# Patient Record
Sex: FEMALE | Race: WHITE | NOT HISPANIC OR LATINO | Employment: UNEMPLOYED | URBAN - NONMETROPOLITAN AREA
[De-identification: names, ages, dates, MRNs, and addresses within clinical notes are randomized per-mention and may not be internally consistent; named-entity substitution may affect disease eponyms.]

---

## 2018-01-24 ENCOUNTER — OFFICE VISIT (OUTPATIENT)
Dept: FAMILY MEDICINE CLINIC | Facility: CLINIC | Age: 23
End: 2018-01-24

## 2018-01-24 VITALS
WEIGHT: 224 LBS | BODY MASS INDEX: 33.18 KG/M2 | HEIGHT: 69 IN | HEART RATE: 76 BPM | DIASTOLIC BLOOD PRESSURE: 91 MMHG | SYSTOLIC BLOOD PRESSURE: 155 MMHG | OXYGEN SATURATION: 99 %

## 2018-01-24 DIAGNOSIS — L70.9 ACNE, UNSPECIFIED ACNE TYPE: ICD-10-CM

## 2018-01-24 DIAGNOSIS — J31.0 RHINOSINUSITIS: Primary | ICD-10-CM

## 2018-01-24 DIAGNOSIS — L72.0 EPIDERMOID CYST OF SKIN OF CHEST: ICD-10-CM

## 2018-01-24 DIAGNOSIS — J32.9 RHINOSINUSITIS: Primary | ICD-10-CM

## 2018-01-24 PROCEDURE — 99203 OFFICE O/P NEW LOW 30 MIN: CPT | Performed by: NURSE PRACTITIONER

## 2018-01-24 RX ORDER — FLUTICASONE PROPIONATE 50 MCG
2 SPRAY, SUSPENSION (ML) NASAL DAILY
Qty: 16 G | Refills: 5 | Status: SHIPPED | OUTPATIENT
Start: 2018-01-24 | End: 2019-02-26

## 2018-01-24 RX ORDER — LORATADINE 10 MG/1
10 TABLET ORAL DAILY
Qty: 30 TABLET | Refills: 5 | Status: SHIPPED | OUTPATIENT
Start: 2018-01-24 | End: 2019-02-26

## 2018-01-24 NOTE — PROGRESS NOTES
Subjective   Sandy Stephenson is a 22 y.o. female.     Chief Complaint: Establish Care    History of Present Illness   Patient here to establish care today.  Patient has previously been seen by pediatrician.  She is now 22 years old and needs to have a primary care provider.  Patient states that she does have a history of intermittent elevated blood pressure.  She denies any chest pain, palpitations, headaches, dizziness or syncope.  She has never been placed on any medication for her blood pressure.  I have discussed with her today that we need to watch her blood pressure over the next few months.  If he continues to be elevated, she will need to go ahead and start on some medication.  We have discussed her diet; low sodium/salt intake.    Acne.  Patient states that she has had some increased acne on her face for the past few months.  She states that she was using some Kyara cleanser and her acne did increase from that product.  I have advised her to stop using the product altogether.  I have also advised her to try washing her face twice daily with antibacterial soap.  Patient states understanding today.    Patient states that she does have a cyst between her breasts that she has had for several months.  It is approximately quarter size.  She states the area is very irritated and causes her some discomfort when she lies down at night.  She states that she has squeezed the area before and had some clear to white discharge from the area.  She states the area seems to be getting larger at this time.  After discussion with patient today, I feel she needs to be referred to general surgeon to evaluate whether this cyst needs to be removed.  Patient is agreeable to this plan today.    Allergies.  Patient also states that she has been having some head and nasal congestion for the past couple of weeks.  She's been having some sneezing and watery eyes at times.  She denies fever, headache, sore throat, ear pain, nausea  vomiting or diarrhea.  She has not been taking any medication for her symptoms today.  I have suggested that she start on Claritin and Flonase for her symptoms.  Patient is agreeable to this plan today.    Family History   Problem Relation Age of Onset   • Hypertension Mother    • Thyroid disease Mother    • No Known Problems Father      car wreck    • Breast cancer Paternal Grandmother    • Hypertension Paternal Grandmother    • Arthritis Paternal Grandmother        Social History     Social History   • Marital status: Single     Spouse name: N/A   • Number of children: N/A   • Years of education: N/A     Occupational History   • Not on file.     Social History Main Topics   • Smoking status: Current Some Day Smoker     Types: Cigarettes   • Smokeless tobacco: Never Used   • Alcohol use No   • Drug use: No   • Sexual activity: Defer     Other Topics Concern   • Not on file     Social History Narrative   • No narrative on file       Past Medical History:   Diagnosis Date   • Headache    • Hypertension        Review of Systems   Constitutional: Negative.    HENT: Positive for congestion, postnasal drip and sneezing.    Respiratory: Negative.    Cardiovascular: Negative.    Gastrointestinal: Negative.    Musculoskeletal: Negative.    Skin:        Cyst between breasts; facial acne   Neurological: Negative.    Psychiatric/Behavioral: Negative.        Objective   Physical Exam   Constitutional: She is oriented to person, place, and time. She appears well-developed and well-nourished.   HENT:   Right Ear: External ear normal.   Left Ear: External ear normal.   Mouth/Throat: Oropharynx is clear and moist.   Eyes: Conjunctivae are normal. Pupils are equal, round, and reactive to light.   Neck: Normal range of motion. Neck supple.   Cardiovascular: Normal rate, regular rhythm and normal heart sounds.    Pulmonary/Chest: Effort normal and breath sounds normal.   Neurological: She is alert and oriented to person, place, and  "time.   Skin: Skin is warm and dry.   Psychiatric: She has a normal mood and affect. Her behavior is normal. Judgment and thought content normal.   Nursing note and vitals reviewed.      Procedures    Vitals: Blood pressure 155/91, pulse 76, height 175.3 cm (69\"), weight 102 kg (224 lb), SpO2 99 %, not currently breastfeeding.    Allergies: No Known Allergies       Assessment/Plan   Sandy was seen today for establish care.    Diagnoses and all orders for this visit:    Rhinosinusitis  -     loratadine (CLARITIN) 10 MG tablet; Take 1 tablet by mouth Daily.  -     fluticasone (FLONASE) 50 MCG/ACT nasal spray; 2 sprays into each nostril Daily.    Acne, unspecified acne type    Epidermoid cyst of skin of chest  -     Ambulatory Referral to General Surgery               "

## 2018-02-02 ENCOUNTER — OFFICE VISIT (OUTPATIENT)
Dept: SURGERY | Facility: CLINIC | Age: 23
End: 2018-02-02

## 2018-02-02 VITALS
SYSTOLIC BLOOD PRESSURE: 151 MMHG | WEIGHT: 224.2 LBS | HEIGHT: 69 IN | HEART RATE: 95 BPM | BODY MASS INDEX: 33.21 KG/M2 | DIASTOLIC BLOOD PRESSURE: 92 MMHG

## 2018-02-02 DIAGNOSIS — L72.3 SEBACEOUS CYST: Primary | ICD-10-CM

## 2018-02-02 PROCEDURE — 99242 OFF/OP CONSLTJ NEW/EST SF 20: CPT | Performed by: SURGERY

## 2018-02-02 NOTE — PROGRESS NOTES
"Subjective   Sandy Stephenson is a 22 y.o. female here today for place on her chest referred by Ute Taylor.    History of Present Illness  Ms. Stephenson was seen in the office today for evaluation of a symptomatic sebaceous cyst on her chest wall between her breasts.  The patient states this has been present for about a year.  At times it appears to swell and become more tender.  She has never had it drained.  Because of the ongoing tenderness the patient wishes to have it removed  No Known Allergies  Current Outpatient Prescriptions   Medication Sig Dispense Refill   • fluticasone (FLONASE) 50 MCG/ACT nasal spray 2 sprays into each nostril Daily. 16 g 5   • loratadine (CLARITIN) 10 MG tablet Take 1 tablet by mouth Daily. 30 tablet 5     No current facility-administered medications for this visit.      Past Medical History:   Diagnosis Date   • Headache    • Hypertension    • Hypertension      Past Surgical History:   Procedure Laterality Date   • WISDOM TOOTH EXTRACTION  2014     Review of Systems  General: fever  Integumentary: lump  Eyes: glasses  ENT: earache and recurrent sore throat  Respiratory: shortness of breath, chronic cough and wheezing  Gastrointestinal: diarrhea and constipation  Cardiovascular: chest pain  Neurological: numbness, confusion, headaches and dizziness  Psychiatric: anxiety  Hematologic/Lymphatic: negative  Genitourinary: negative  Musculoskeletal: back pain  Endocrine: negative  Breasts: negative    Objective   /92 (BP Location: Left arm, Patient Position: Sitting)  Pulse 95  Ht 175.3 cm (69\")  Wt 102 kg (224 lb 3.2 oz)  BMI 33.11 kg/m2  Physical Exam  On examination this is a well-developed well-nourished white female in no acute distress  HEENT examination: Within normal limits.  Conjunctiva pink.  Nose and ears appear normal.  Neck: Supple, full range of motion.  No JVD.  Musculoskeletal: Full range of motion all extremities without focal weakness. Normal gait. No digital " cyanosis.  Psych: Patient is alert, oriented x3. Mood and affect are appropriate.  Skin:  Between the breasts in the midline is a 2 x 0.9 cm non-currently infected sebaceous cyst.  Results/Data    Procedures     Assessment/Plan     Sebaceous cyst of chest wall.  Based upon the fact that it fluctuates in size and becomes more tender I suspect it is getting intermittently infected and based upon that fact it is reasonable to proceed with office excision.  The patient will return for office procedure once authorization has been obtained           Discussion/Summary    Errors in dictation may reflect use of voice recognition software and not all errors in transcription may have been detected prior to signing.    Future Appointments  Date Time Provider Department Center   2/2/2018 10:00 AM Ailyn Fountain MD [facility] None

## 2018-02-04 PROBLEM — L72.3 SEBACEOUS CYST: Status: ACTIVE | Noted: 2018-02-04

## 2018-02-19 ENCOUNTER — TELEPHONE (OUTPATIENT)
Dept: SURGERY | Facility: CLINIC | Age: 23
End: 2018-02-19

## 2018-02-22 ENCOUNTER — PROCEDURE VISIT (OUTPATIENT)
Dept: SURGERY | Facility: CLINIC | Age: 23
End: 2018-02-22

## 2018-02-22 VITALS
SYSTOLIC BLOOD PRESSURE: 149 MMHG | DIASTOLIC BLOOD PRESSURE: 82 MMHG | HEIGHT: 69 IN | HEART RATE: 98 BPM | WEIGHT: 222.6 LBS | BODY MASS INDEX: 32.97 KG/M2

## 2018-02-22 DIAGNOSIS — L72.3 SEBACEOUS CYST: Primary | ICD-10-CM

## 2018-02-22 PROCEDURE — 11422 EXC H-F-NK-SP B9+MARG 1.1-2: CPT | Performed by: SURGERY

## 2018-02-22 NOTE — PROGRESS NOTES
"Subjective   Sandy Stephenson is a 22 y.o. female cyst removal  History of Present Illness  Ms. Stephenson was seen in the office today for excision of a recently infected sebaceous cyst of the trunk.  She states the swelling has decreased and the lesion is smaller  No Known Allergies    Current Outpatient Prescriptions   Medication Sig Dispense Refill   • fluticasone (FLONASE) 50 MCG/ACT nasal spray 2 sprays into each nostril Daily. 16 g 5   • loratadine (CLARITIN) 10 MG tablet Take 1 tablet by mouth Daily. 30 tablet 5     No current facility-administered medications for this visit.      Past Medical History:   Diagnosis Date   • Headache    • Hypertension    • Hypertension      Past Surgical History:   Procedure Laterality Date   • WISDOM TOOTH EXTRACTION  2014     The following portions of the patient's history were reviewed and updated as appropriate: allergies, current medications, past family history, past medical history, past social history, past surgical history and problem list.    Review of systems:  Unchanged from prior except HPI    Objective   /82 (BP Location: Right arm, Patient Position: Sitting)  Pulse 98  Ht 175.3 cm (69.02\")  Wt 101 kg (222 lb 9.6 oz)  BMI 32.86 kg/m2   Physical Exam  On examination this is a well-developed well-nourished white female in no acute distress  HEENT examination: Within normal limits.  Conjunctiva pink.  Nose and ears appear normal.  Neck: Supple, full range of motion.  No JVD.  Musculoskeletal: Full range of motion all extremities without focal weakness. Normal gait. No digital cyanosis.  Psych: Patient is alert, oriented x3. Mood and affect are appropriate.  Skin:  On the anterior chest wall is approximately a 1 cm sebaceous cyst.  No evidence of infection.  Results/Data      Procedures   Procedure Note    Procedure:  Excision sebaceous cyst    Location:  Anterior trunk    Anesthesia:  1% lidocaine with epinephrine    Description:  The risks and benefits of the " procedure were discussed.  After prep of the skin with Betadine and infiltration with lidocaine an elliptical incision was made in the anterior chest wall and carried down into the tissue.  The sebaceous cyst was excised.  Skin was closed with 4-0 Vicryl subcuticular stitch.  Steri-Strips and dressing were placed.  Incision length was 1.5 cm    Complications:  none    Follow-up:  As needed  Assessment/Plan     Sebaceous cysts anterior trunk, status post office excision    Follow-up as needed         Discussion/Summary    Errors in dictation may reflect use of voice recognition software and not all errors in transcription may have been detected prior to signing.    Future Appointments  Date Time Provider Department Center   2/22/2018 11:40 AM Ailyn Fountain MD [facility] None

## 2018-05-08 ENCOUNTER — OFFICE VISIT (OUTPATIENT)
Dept: FAMILY MEDICINE CLINIC | Facility: CLINIC | Age: 23
End: 2018-05-08

## 2018-05-08 VITALS
WEIGHT: 221 LBS | DIASTOLIC BLOOD PRESSURE: 100 MMHG | HEART RATE: 90 BPM | OXYGEN SATURATION: 98 % | HEIGHT: 69 IN | SYSTOLIC BLOOD PRESSURE: 160 MMHG | BODY MASS INDEX: 32.73 KG/M2 | TEMPERATURE: 99.4 F

## 2018-05-08 DIAGNOSIS — J06.9 UPPER RESPIRATORY TRACT INFECTION, UNSPECIFIED TYPE: Primary | ICD-10-CM

## 2018-05-08 DIAGNOSIS — I10 ESSENTIAL HYPERTENSION: ICD-10-CM

## 2018-05-08 DIAGNOSIS — D22.9 CHANGE IN MOLE: ICD-10-CM

## 2018-05-08 PROCEDURE — 96372 THER/PROPH/DIAG INJ SC/IM: CPT | Performed by: NURSE PRACTITIONER

## 2018-05-08 PROCEDURE — 99214 OFFICE O/P EST MOD 30 MIN: CPT | Performed by: NURSE PRACTITIONER

## 2018-05-08 RX ORDER — DEXAMETHASONE SODIUM PHOSPHATE 4 MG/ML
8 INJECTION, SOLUTION INTRA-ARTICULAR; INTRALESIONAL; INTRAMUSCULAR; INTRAVENOUS; SOFT TISSUE ONCE
Status: DISCONTINUED | OUTPATIENT
Start: 2018-05-08 | End: 2018-05-08

## 2018-05-08 RX ORDER — LISINOPRIL 20 MG/1
20 TABLET ORAL DAILY
Qty: 30 TABLET | Refills: 2 | OUTPATIENT
Start: 2018-05-08 | End: 2018-10-25 | Stop reason: SDUPTHER

## 2018-05-08 RX ORDER — LISINOPRIL 20 MG/1
20 TABLET ORAL DAILY
Qty: 30 TABLET | Refills: 2 | Status: SHIPPED | OUTPATIENT
Start: 2018-05-08 | End: 2018-05-08 | Stop reason: SDUPTHER

## 2018-05-08 RX ORDER — BROMPHENIRAMINE MALEATE, PSEUDOEPHEDRINE HYDROCHLORIDE, AND DEXTROMETHORPHAN HYDROBROMIDE 2; 30; 10 MG/5ML; MG/5ML; MG/5ML
10 SYRUP ORAL 4 TIMES DAILY PRN
Qty: 240 ML | Refills: 0 | Status: SHIPPED | OUTPATIENT
Start: 2018-05-08 | End: 2019-02-26

## 2018-05-08 RX ORDER — DEXAMETHASONE SODIUM PHOSPHATE 4 MG/ML
4 INJECTION, SOLUTION INTRA-ARTICULAR; INTRALESIONAL; INTRAMUSCULAR; INTRAVENOUS; SOFT TISSUE ONCE
Status: COMPLETED | OUTPATIENT
Start: 2018-05-08 | End: 2018-05-08

## 2018-05-08 RX ADMIN — DEXAMETHASONE SODIUM PHOSPHATE 4 MG: 4 INJECTION, SOLUTION INTRA-ARTICULAR; INTRALESIONAL; INTRAMUSCULAR; INTRAVENOUS; SOFT TISSUE at 13:53

## 2018-05-08 NOTE — PATIENT INSTRUCTIONS
Health Risks of Smoking  Smoking cigarettes is very bad for your health. Tobacco smoke has over 200 known poisons in it. It contains the poisonous gases nitrogen oxide and carbon monoxide. There are over 60 chemicals in tobacco smoke that cause cancer.  Smoking is difficult to quit because a chemical in tobacco, called nicotine, causes addiction or dependence. When you smoke and inhale, nicotine is absorbed rapidly into the bloodstream through your lungs. Both inhaled and non-inhaled nicotine may be addictive.  What are the risks of cigarette smoke?  Cigarette smokers have an increased risk of many serious medical problems, including:  · Lung cancer.  · Lung disease, such as pneumonia, bronchitis, and emphysema.  · Chest pain (angina) and heart attack because the heart is not getting enough oxygen.  · Heart disease and peripheral blood vessel disease.  · High blood pressure (hypertension).  · Stroke.  · Oral cancer, including cancer of the lip, mouth, or voice box.  · Bladder cancer.  · Pancreatic cancer.  · Cervical cancer.  · Pregnancy complications, including premature birth.  · Stillbirths and smaller  babies, birth defects, and genetic damage to sperm.  · Early menopause.  · Lower estrogen level for women.  · Infertility.  · Facial wrinkles.  · Blindness.  · Increased risk of broken bones (fractures).  · Senile dementia.  · Stomach ulcers and internal bleeding.  · Delayed wound healing and increased risk of complications during surgery.  · Even smoking lightly shortens your life expectancy by several years.  Because of secondhand smoke exposure, children of smokers have an increased risk of the following:  · Sudden infant death syndrome (SIDS).  · Respiratory infections.  · Lung cancer.  · Heart disease.  · Ear infections.  What are the benefits of quitting?  There are many health benefits of quitting smoking. Here are some of them:  · Within days of quitting smoking, your risk of having a heart attack  decreases, your blood flow improves, and your lung capacity improves. Blood pressure, pulse rate, and breathing patterns start returning to normal soon after quitting.  · Within months, your lungs may clear up completely.  · Quitting for 10 years reduces your risk of developing lung cancer and heart disease to almost that of a nonsmoker.  · People who quit may see an improvement in their overall quality of life.  How do I quit smoking?  Smoking is an addiction with both physical and psychological effects, and longtime habits can be hard to change. Your health care provider can recommend:  · Programs and community resources, which may include group support, education, or talk therapy.  · Prescription medicines to help reduce cravings.  · Nicotine replacement products, such as patches, gum, and nasal sprays. Use these products only as directed. Do not replace cigarette smoking with electronic cigarettes, which are commonly called e-cigarettes. The safety of e-cigarettes is not known, and some may contain harmful chemicals.  · A combination of two or more of these methods.  Where to find more information:  · American Lung Association: www.lung.org  · American Cancer Society: www.cancer.org  Summary  · Smoking cigarettes is very bad for your health. Cigarette smokers have an increased risk of many serious medical problems, including several cancers, heart disease, and stroke.  · Smoking is an addiction with both physical and psychological effects, and longtime habits can be hard to change.  · By stopping right away, you can greatly reduce the risk of medical problems for you and your family.  · To help you quit smoking, your health care provider can recommend programs, community resources, prescription medicines, and nicotine replacement products such as patches, gum, and nasal sprays.  This information is not intended to replace advice given to you by your health care provider. Make sure you discuss any questions you  "have with your health care provider.  Document Released: 01/25/2006 Document Revised: 12/22/2017 Document Reviewed: 12/22/2017  [employer] Interactive Patient Education © 2017 [employer]  Hypertension  Hypertension, commonly called high blood pressure, is when the force of blood pumping through the arteries is too strong. The arteries are the blood vessels that carry blood from the heart throughout the body. Hypertension forces the heart to work harder to pump blood and may cause arteries to become narrow or stiff. Having untreated or uncontrolled hypertension can cause heart attacks, strokes, kidney disease, and other problems.  A blood pressure reading consists of a higher number over a lower number. Ideally, your blood pressure should be below 120/80. The first (\"top\") number is called the systolic pressure. It is a measure of the pressure in your arteries as your heart beats. The second (\"bottom\") number is called the diastolic pressure. It is a measure of the pressure in your arteries as the heart relaxes.  What are the causes?  The cause of this condition is not known.  What increases the risk?  Some risk factors for high blood pressure are under your control. Others are not.  Factors you can change   · Smoking.  · Having type 2 diabetes mellitus, high cholesterol, or both.  · Not getting enough exercise or physical activity.  · Being overweight.  · Having too much fat, sugar, calories, or salt (sodium) in your diet.  · Drinking too much alcohol.  Factors that are difficult or impossible to change   · Having chronic kidney disease.  · Having a family history of high blood pressure.  · Age. Risk increases with age.  · Race. You may be at higher risk if you are -American.  · Gender. Men are at higher risk than women before age 45. After age 65, women are at higher risk than men.  · Having obstructive sleep apnea.  · Stress.  What are the signs or symptoms?  Extremely high blood pressure (hypertensive " crisis) may cause:  · Headache.  · Anxiety.  · Shortness of breath.  · Nosebleed.  · Nausea and vomiting.  · Severe chest pain.  · Jerky movements you cannot control (seizures).  How is this diagnosed?  This condition is diagnosed by measuring your blood pressure while you are seated, with your arm resting on a surface. The cuff of the blood pressure monitor will be placed directly against the skin of your upper arm at the level of your heart. It should be measured at least twice using the same arm. Certain conditions can cause a difference in blood pressure between your right and left arms.  Certain factors can cause blood pressure readings to be lower or higher than normal (elevated) for a short period of time:  · When your blood pressure is higher when you are in a health care provider's office than when you are at home, this is called white coat hypertension. Most people with this condition do not need medicines.  · When your blood pressure is higher at home than when you are in a health care provider's office, this is called masked hypertension. Most people with this condition may need medicines to control blood pressure.  If you have a high blood pressure reading during one visit or you have normal blood pressure with other risk factors:  · You may be asked to return on a different day to have your blood pressure checked again.  · You may be asked to monitor your blood pressure at home for 1 week or longer.  If you are diagnosed with hypertension, you may have other blood or imaging tests to help your health care provider understand your overall risk for other conditions.  How is this treated?  This condition is treated by making healthy lifestyle changes, such as eating healthy foods, exercising more, and reducing your alcohol intake. Your health care provider may prescribe medicine if lifestyle changes are not enough to get your blood pressure under control, and if:  · Your systolic blood pressure is above  130.  · Your diastolic blood pressure is above 80.  Your personal target blood pressure may vary depending on your medical conditions, your age, and other factors.  Follow these instructions at home:  Eating and drinking   · Eat a diet that is high in fiber and potassium, and low in sodium, added sugar, and fat. An example eating plan is called the DASH (Dietary Approaches to Stop Hypertension) diet. To eat this way:  ¨ Eat plenty of fresh fruits and vegetables. Try to fill half of your plate at each meal with fruits and vegetables.  ¨ Eat whole grains, such as whole wheat pasta, brown rice, or whole grain bread. Fill about one quarter of your plate with whole grains.  ¨ Eat or drink low-fat dairy products, such as skim milk or low-fat yogurt.  ¨ Avoid fatty cuts of meat, processed or cured meats, and poultry with skin. Fill about one quarter of your plate with lean proteins, such as fish, chicken without skin, beans, eggs, and tofu.  ¨ Avoid premade and processed foods. These tend to be higher in sodium, added sugar, and fat.  · Reduce your daily sodium intake. Most people with hypertension should eat less than 1,500 mg of sodium a day.  · Limit alcohol intake to no more than 1 drink a day for nonpregnant women and 2 drinks a day for men. One drink equals 12 oz of beer, 5 oz of wine, or 1½ oz of hard liquor.  Lifestyle   · Work with your health care provider to maintain a healthy body weight or to lose weight. Ask what an ideal weight is for you.  · Get at least 30 minutes of exercise that causes your heart to beat faster (aerobic exercise) most days of the week. Activities may include walking, swimming, or biking.  · Include exercise to strengthen your muscles (resistance exercise), such as pilates or lifting weights, as part of your weekly exercise routine. Try to do these types of exercises for 30 minutes at least 3 days a week.  · Do not use any products that contain nicotine or tobacco, such as cigarettes and  e-cigarettes. If you need help quitting, ask your health care provider.  · Monitor your blood pressure at home as told by your health care provider.  · Keep all follow-up visits as told by your health care provider. This is important.  Medicines   · Take over-the-counter and prescription medicines only as told by your health care provider. Follow directions carefully. Blood pressure medicines must be taken as prescribed.  · Do not skip doses of blood pressure medicine. Doing this puts you at risk for problems and can make the medicine less effective.  · Ask your health care provider about side effects or reactions to medicines that you should watch for.  Contact a health care provider if:  · You think you are having a reaction to a medicine you are taking.  · You have headaches that keep coming back (recurring).  · You feel dizzy.  · You have swelling in your ankles.  · You have trouble with your vision.  Get help right away if:  · You develop a severe headache or confusion.  · You have unusual weakness or numbness.  · You feel faint.  · You have severe pain in your chest or abdomen.  · You vomit repeatedly.  · You have trouble breathing.  Summary  · Hypertension is when the force of blood pumping through your arteries is too strong. If this condition is not controlled, it may put you at risk for serious complications.  · Your personal target blood pressure may vary depending on your medical conditions, your age, and other factors. For most people, a normal blood pressure is less than 120/80.  · Hypertension is treated with lifestyle changes, medicines, or a combination of both. Lifestyle changes include weight loss, eating a healthy, low-sodium diet, exercising more, and limiting alcohol.  This information is not intended to replace advice given to you by your health care provider. Make sure you discuss any questions you have with your health care provider.  Document Released: 12/18/2006 Document Revised:  11/15/2017 Document Reviewed: 11/15/2017  [employer] Interactive Patient Education © 2017 [employer]

## 2018-05-08 NOTE — TELEPHONE ENCOUNTER
Pharmacy called patient there requesting her lisinopril,cancelled at [facility],verbal refills given per orders.

## 2018-05-08 NOTE — PROGRESS NOTES
Subjective   Sandy Stephenson is a 23 y.o. female.     Chief Complaint: Cough; Skin Problem; and Nausea    URI    This is a new problem. The current episode started 1 to 4 weeks ago. The problem has been unchanged. There has been no fever. Associated symptoms include congestion, coughing, headaches, neck pain and a rash. Pertinent negatives include no abdominal pain, chest pain, rhinorrhea or sore throat. She has tried nothing for the symptoms.   Rash   This is a chronic (mole on her left upper shoulder that has changed in color and size; skin tag to left  neck) problem. The current episode started more than 1 year ago. The problem is unchanged. The affected locations include the left shoulder. She was exposed to nothing. Associated symptoms include congestion and coughing. Pertinent negatives include no rhinorrhea, shortness of breath or sore throat. Past treatments include nothing.   Hypertension   This is a chronic problem. The current episode started more than 1 month ago. The problem has been gradually worsening since onset. The problem is uncontrolled. Associated symptoms include headaches and neck pain. Pertinent negatives include no chest pain, palpitations or shortness of breath. There are no associated agents to hypertension. Risk factors for coronary artery disease include obesity and smoking/tobacco exposure. Past treatments include nothing.      Patient's Body mass index is 32.64 kg/m². BMI is above normal parameters. Recommendations include: educational material.  I advised the patient of the risks in continuing to use tobacco, and I provided this patient with smoking cessation educational materials.    During this visit, I spent 2 minutes counseling the patient regarding smoking cessation.    Family History   Problem Relation Age of Onset   • Hypertension Mother    • Thyroid disease Mother    • Cancer Mother      breast   • No Known Problems Father      car wreck    • Breast cancer Paternal Grandmother     • Hypertension Paternal Grandmother    • Arthritis Paternal Grandmother    • Cancer Paternal Grandmother      Breast       Social History     Social History   • Marital status: Single     Spouse name: N/A   • Number of children: N/A   • Years of education: N/A     Occupational History   • Not on file.     Social History Main Topics   • Smoking status: Current Every Day Smoker     Packs/day: 1.00     Types: Cigarettes   • Smokeless tobacco: Never Used   • Alcohol use No   • Drug use: No   • Sexual activity: Defer     Other Topics Concern   • Not on file     Social History Narrative   • No narrative on file       Past Medical History:   Diagnosis Date   • Headache    • Hypertension    • Hypertension        Review of Systems   Constitutional: Negative.    HENT: Positive for congestion. Negative for rhinorrhea and sore throat.    Respiratory: Positive for cough. Negative for shortness of breath.    Cardiovascular: Negative.  Negative for chest pain and palpitations.   Gastrointestinal: Negative.  Negative for abdominal pain.   Musculoskeletal: Positive for neck pain.   Skin: Positive for rash.   Neurological: Positive for headaches.   Psychiatric/Behavioral: Negative.        Objective   Physical Exam   Constitutional: She is oriented to person, place, and time. She appears well-developed and well-nourished.   HENT:   Right Ear: External ear normal.   Left Ear: External ear normal.   Mouth/Throat: Oropharynx is clear and moist.   Eyes: Conjunctivae are normal. Pupils are equal, round, and reactive to light.   Neck: Normal range of motion. Neck supple.   Cardiovascular: Normal rate, regular rhythm and normal heart sounds.    Pulmonary/Chest: Effort normal. She has wheezes.   Neurological: She is alert and oriented to person, place, and time.   Skin: Skin is warm and dry.   Psychiatric: She has a normal mood and affect. Her behavior is normal. Judgment and thought content normal.   Nursing note and vitals  "reviewed.      Procedures    Vitals: Blood pressure 160/100, pulse 90, temperature 99.4 °F (37.4 °C), temperature source Oral, height 175.3 cm (69\"), weight 100 kg (221 lb), SpO2 98 %, not currently breastfeeding.    Allergies: No Known Allergies     During this visit the following were done:  Labs Reviewed []    Labs Ordered []    Radiology Reports Reviewed []    Radiology Ordered []    PCP Records Reviewed []    Referring Provider Records Reviewed []    ER Records Reviewed []    Hospital Records Reviewed []    History Obtained From Family []    Radiology Images Reviewed []    Other Reviewed []    Records Requested []      Assessment/Plan   Sandy was seen today for cough, skin problem and nausea.    Diagnoses and all orders for this visit:    Upper respiratory tract infection, unspecified type  -     dexamethasone (DECADRON) injection 8 mg; Inject 2 mL into the shoulder, thigh, or buttocks 1 (One) Time.  -     brompheniramine-pseudoephedrine-DM 30-2-10 MG/5ML syrup; Take 10 mL by mouth 4 (Four) Times a Day As Needed for Congestion or Cough.    Change in mole  -     Ambulatory Referral to Dermatology    Essential hypertension  -     lisinopril (PRINIVIL,ZESTRIL) 20 MG tablet; Take 1 tablet by mouth Daily.               "

## 2018-10-25 DIAGNOSIS — I10 ESSENTIAL HYPERTENSION: ICD-10-CM

## 2018-10-25 RX ORDER — LISINOPRIL 20 MG/1
TABLET ORAL
Qty: 30 TABLET | Refills: 0 | Status: SHIPPED | OUTPATIENT
Start: 2018-10-25 | End: 2019-01-02 | Stop reason: SDUPTHER

## 2018-12-21 DIAGNOSIS — I10 ESSENTIAL HYPERTENSION: ICD-10-CM

## 2018-12-21 RX ORDER — LISINOPRIL 20 MG/1
TABLET ORAL
Qty: 30 TABLET | Refills: 0 | OUTPATIENT
Start: 2018-12-21

## 2019-01-02 ENCOUNTER — TELEPHONE (OUTPATIENT)
Dept: FAMILY MEDICINE CLINIC | Facility: CLINIC | Age: 24
End: 2019-01-02

## 2019-01-02 DIAGNOSIS — I10 ESSENTIAL HYPERTENSION: ICD-10-CM

## 2019-01-02 RX ORDER — LISINOPRIL 20 MG/1
20 TABLET ORAL DAILY
Qty: 30 TABLET | Refills: 0 | Status: SHIPPED | OUTPATIENT
Start: 2019-01-02 | End: 2019-02-26 | Stop reason: SDUPTHER

## 2019-01-02 NOTE — TELEPHONE ENCOUNTER
Pts phone numbers are d/c. Called pharmacy and they do not have working number for pt either. Will call in 30 days supply and regina stated PT MUST MAKE APPT FOR REFILLS.

## 2019-02-26 ENCOUNTER — OFFICE VISIT (OUTPATIENT)
Dept: FAMILY MEDICINE CLINIC | Facility: CLINIC | Age: 24
End: 2019-02-26

## 2019-02-26 VITALS
WEIGHT: 227 LBS | TEMPERATURE: 99 F | SYSTOLIC BLOOD PRESSURE: 142 MMHG | BODY MASS INDEX: 33.62 KG/M2 | HEART RATE: 84 BPM | DIASTOLIC BLOOD PRESSURE: 100 MMHG | OXYGEN SATURATION: 99 % | HEIGHT: 69 IN

## 2019-02-26 DIAGNOSIS — R07.9 CHEST PAIN, UNSPECIFIED TYPE: ICD-10-CM

## 2019-02-26 DIAGNOSIS — E66.09 CLASS 1 OBESITY DUE TO EXCESS CALORIES WITHOUT SERIOUS COMORBIDITY WITH BODY MASS INDEX (BMI) OF 33.0 TO 33.9 IN ADULT: ICD-10-CM

## 2019-02-26 DIAGNOSIS — K21.9 GASTROESOPHAGEAL REFLUX DISEASE, ESOPHAGITIS PRESENCE NOT SPECIFIED: Primary | ICD-10-CM

## 2019-02-26 DIAGNOSIS — I10 ESSENTIAL HYPERTENSION: ICD-10-CM

## 2019-02-26 LAB
ALBUMIN SERPL-MCNC: 4.5 G/DL (ref 3.5–5)
ALBUMIN/GLOB SERPL: 1.7 G/DL (ref 1.5–2.5)
ALP SERPL-CCNC: 70 U/L (ref 35–104)
ALT SERPL W P-5'-P-CCNC: 21 U/L (ref 10–36)
ANION GAP SERPL CALCULATED.3IONS-SCNC: 4.9 MMOL/L (ref 3.6–11.2)
AST SERPL-CCNC: 22 U/L (ref 10–30)
BASOPHILS # BLD AUTO: 0.04 10*3/MM3 (ref 0–0.3)
BASOPHILS NFR BLD AUTO: 0.4 % (ref 0–2)
BILIRUB SERPL-MCNC: 0.3 MG/DL (ref 0.2–1.8)
BUN BLD-MCNC: 9 MG/DL (ref 7–21)
BUN/CREAT SERPL: 12 (ref 7–25)
CALCIUM SPEC-SCNC: 9.6 MG/DL (ref 7.7–10)
CHLORIDE SERPL-SCNC: 111 MMOL/L (ref 99–112)
CO2 SERPL-SCNC: 25.1 MMOL/L (ref 24.3–31.9)
CREAT BLD-MCNC: 0.75 MG/DL (ref 0.43–1.29)
DEPRECATED RDW RBC AUTO: 41.1 FL (ref 37–54)
EOSINOPHIL # BLD AUTO: 0.23 10*3/MM3 (ref 0–0.7)
EOSINOPHIL NFR BLD AUTO: 2.6 % (ref 0–5)
ERYTHROCYTE [DISTWIDTH] IN BLOOD BY AUTOMATED COUNT: 13.6 % (ref 11.5–14.5)
GFR SERPL CREATININE-BSD FRML MDRD: 98 ML/MIN/1.73
GLOBULIN UR ELPH-MCNC: 2.7 GM/DL
GLUCOSE BLD-MCNC: 91 MG/DL (ref 70–110)
HCT VFR BLD AUTO: 41.8 % (ref 37–47)
HGB BLD-MCNC: 13.7 G/DL (ref 12–16)
IMM GRANULOCYTES # BLD AUTO: 0.02 10*3/MM3 (ref 0–0.03)
IMM GRANULOCYTES NFR BLD AUTO: 0.2 % (ref 0–0.5)
LYMPHOCYTES # BLD AUTO: 2.11 10*3/MM3 (ref 1–3)
LYMPHOCYTES NFR BLD AUTO: 23.5 % (ref 21–51)
MAGNESIUM SERPL-MCNC: 2 MG/DL (ref 1.7–2.6)
MCH RBC QN AUTO: 27.3 PG (ref 27–33)
MCHC RBC AUTO-ENTMCNC: 32.8 G/DL (ref 33–37)
MCV RBC AUTO: 83.4 FL (ref 80–94)
MONOCYTES # BLD AUTO: 0.57 10*3/MM3 (ref 0.1–0.9)
MONOCYTES NFR BLD AUTO: 6.3 % (ref 0–10)
NEUTROPHILS # BLD AUTO: 6.01 10*3/MM3 (ref 1.4–6.5)
NEUTROPHILS NFR BLD AUTO: 67 % (ref 30–70)
OSMOLALITY SERPL CALC.SUM OF ELEC: 279.5 MOSM/KG (ref 273–305)
PLATELET # BLD AUTO: 307 10*3/MM3 (ref 130–400)
PMV BLD AUTO: 11 FL (ref 6–10)
POTASSIUM BLD-SCNC: 4.1 MMOL/L (ref 3.5–5.3)
PROT SERPL-MCNC: 7.2 G/DL (ref 6–8)
RBC # BLD AUTO: 5.01 10*6/MM3 (ref 4.2–5.4)
SODIUM BLD-SCNC: 141 MMOL/L (ref 135–153)
T4 FREE SERPL-MCNC: 1.06 NG/DL (ref 0.89–1.76)
TSH SERPL DL<=0.05 MIU/L-ACNC: 1.21 MIU/ML (ref 0.55–4.78)
VIT B12 BLD-MCNC: 602 PG/ML (ref 211–911)
WBC NRBC COR # BLD: 8.98 10*3/MM3 (ref 4.5–12.5)

## 2019-02-26 PROCEDURE — 82607 VITAMIN B-12: CPT | Performed by: NURSE PRACTITIONER

## 2019-02-26 PROCEDURE — 80053 COMPREHEN METABOLIC PANEL: CPT | Performed by: NURSE PRACTITIONER

## 2019-02-26 PROCEDURE — 85025 COMPLETE CBC W/AUTO DIFF WBC: CPT | Performed by: NURSE PRACTITIONER

## 2019-02-26 PROCEDURE — 84443 ASSAY THYROID STIM HORMONE: CPT | Performed by: NURSE PRACTITIONER

## 2019-02-26 PROCEDURE — 93000 ELECTROCARDIOGRAM COMPLETE: CPT | Performed by: NURSE PRACTITIONER

## 2019-02-26 PROCEDURE — 84439 ASSAY OF FREE THYROXINE: CPT | Performed by: NURSE PRACTITIONER

## 2019-02-26 PROCEDURE — 83735 ASSAY OF MAGNESIUM: CPT | Performed by: NURSE PRACTITIONER

## 2019-02-26 PROCEDURE — 99214 OFFICE O/P EST MOD 30 MIN: CPT | Performed by: NURSE PRACTITIONER

## 2019-02-26 RX ORDER — LISINOPRIL 20 MG/1
20 TABLET ORAL DAILY
Qty: 30 TABLET | Refills: 5 | Status: SHIPPED | OUTPATIENT
Start: 2019-02-26 | End: 2019-08-14 | Stop reason: SDUPTHER

## 2019-02-26 RX ORDER — PANTOPRAZOLE SODIUM 40 MG/1
40 TABLET, DELAYED RELEASE ORAL DAILY
Qty: 30 TABLET | Refills: 2 | Status: SHIPPED | OUTPATIENT
Start: 2019-02-26 | End: 2019-08-14

## 2019-02-26 NOTE — PROGRESS NOTES
Subjective   Sandy Stephenson is a 23 y.o. female.     Chief Complaint: Chest Pain and Hypertension    History of Present Illness   Hypertension   This is a chronic problem. The current episode started more than 1 month ago. The problem has been gradually worsening since onset. The problem is uncontrolled. Associated symptoms include headaches and neck pain. Pertinent negatives include no palpitations or shortness of breath. There are no associated agents to hypertension. Risk factors for coronary artery disease include obesity and smoking/tobacco exposure. Past treatments include ACE inhibitor.  Patient states that she ran out of her medication 3 days ago.  She c/o of having sharp pains in the middle of her chest.  Pain lasts for a few hours at a time.  Pain occurs when she lies down at times or when she turns a certain way.  Pain does not radiate.  Pt denies nausea or vomiting with the episodes.  She denies any diaphoresis.  She denies any N/T in her extremities.  She does sometimes get a headache with the pain.    Pain started about a month ago.    She has not checked her BP at home.     Patient's Body mass index is 33.52 kg/m². BMI is above normal parameters. Recommendations include: educational material.    Family History   Problem Relation Age of Onset   • Hypertension Mother    • Thyroid disease Mother    • Cancer Mother         breast   • No Known Problems Father         car wreck    • Breast cancer Paternal Grandmother    • Hypertension Paternal Grandmother    • Arthritis Paternal Grandmother    • Cancer Paternal Grandmother         Breast       Social History     Socioeconomic History   • Marital status: Single     Spouse name: Not on file   • Number of children: Not on file   • Years of education: Not on file   • Highest education level: Not on file   Social Needs   • Financial resource strain: Not on file   • Food insecurity - worry: Not on file   • Food insecurity - inability: Not on file   • Transportation  needs - medical: Not on file   • Transportation needs - non-medical: Not on file   Occupational History   • Not on file   Tobacco Use   • Smoking status: Current Every Day Smoker     Packs/day: 1.00     Types: Cigarettes   • Smokeless tobacco: Never Used   Substance and Sexual Activity   • Alcohol use: No   • Drug use: No   • Sexual activity: Defer   Other Topics Concern   • Not on file   Social History Narrative   • Not on file       Past Medical History:   Diagnosis Date   • Headache    • Hypertension    • Obesity        Review of Systems   Constitutional: Negative.    HENT: Negative.    Respiratory: Negative.    Cardiovascular: Positive for chest pain.   Gastrointestinal: Positive for nausea.        Acid reflux   Musculoskeletal: Negative.    Skin: Negative.    Neurological: Negative.    Psychiatric/Behavioral: Negative.        Objective   Physical Exam   Constitutional: She is oriented to person, place, and time. She appears well-developed and well-nourished.   HENT:   Right Ear: External ear normal.   Left Ear: External ear normal.   Nose: Nose normal.   Mouth/Throat: Oropharynx is clear and moist.   Eyes: Conjunctivae are normal. Pupils are equal, round, and reactive to light.   Neck: Normal range of motion. Neck supple. No thyromegaly present.   Cardiovascular: Normal rate, regular rhythm and normal heart sounds.   No murmur heard.  Pulmonary/Chest: Effort normal and breath sounds normal. No respiratory distress. She has no wheezes.   Abdominal: Soft. Bowel sounds are normal. She exhibits no distension. There is tenderness (mid epigastric).   Lymphadenopathy:     She has no cervical adenopathy.   Neurological: She is alert and oriented to person, place, and time.   Skin: Skin is warm and dry.   Psychiatric: She has a normal mood and affect. Her behavior is normal. Judgment and thought content normal.   Nursing note and vitals reviewed.        ECG 12 Lead  Date/Time: 2/26/2019 3:06 PM  Performed by: Brandon  "LUIS MIGUEL Jean  Authorized by: Ute Taylor APRN   Comparison: not compared with previous ECG   Rhythm: sinus rhythm  Rate: normal  Conduction: conduction normal  ST Segments: ST segments normal  T Waves: T waves normal  QRS axis: normal  Other: no other findings    Clinical impression: normal ECG            Vitals: Blood pressure 142/100, pulse 84, temperature 99 °F (37.2 °C), temperature source Oral, height 175.3 cm (69\"), weight 103 kg (227 lb), SpO2 99 %, not currently breastfeeding.    Allergies: No Known Allergies     During this visit the following were done:  Labs Reviewed []    Labs Ordered []    Radiology Reports Reviewed []    Radiology Ordered []    PCP Records Reviewed []    Referring Provider Records Reviewed []    ER Records Reviewed []    Hospital Records Reviewed []    History Obtained From Family []    Radiology Images Reviewed []    Other Reviewed []    Records Requested []      Assessment/Plan   Sandy was seen today for chest pain and hypertension.    Diagnoses and all orders for this visit:    Gastroesophageal reflux disease, esophagitis presence not specified  -     CBC & Differential  -     Comprehensive Metabolic Panel  -     Magnesium  -     TSH  -     T4, Free  -     Vitamin B12  -     pantoprazole (PROTONIX) 40 MG EC tablet; Take 1 tablet by mouth Daily.    Chest pain, unspecified type  -     ECG 12 Lead  -     CBC & Differential  -     Comprehensive Metabolic Panel  -     Magnesium  -     TSH  -     T4, Free  -     Vitamin B12    Class 1 obesity due to excess calories without serious comorbidity with body mass index (BMI) of 33.0 to 33.9 in adult  -     CBC & Differential  -     Comprehensive Metabolic Panel  -     Magnesium  -     TSH  -     T4, Free  -     Vitamin B12    Essential hypertension  -     lisinopril (PRINIVIL,ZESTRIL) 20 MG tablet; Take 1 tablet by mouth Daily. for blood pressure.        After evaluation, I feel this chest pain may be due to her GERD " symptoms.  Patient is to start on protonix daily and return to clinic in 2 weeks for re-evaluation.  If she has increased chest pain or symptoms, she is to go to the ER for evaluation.  Pt states understanding.

## 2019-02-26 NOTE — PATIENT INSTRUCTIONS
Calorie Counting for Weight Loss  Calories are units of energy. Your body needs a certain amount of calories from food to keep you going throughout the day. When you eat more calories than your body needs, your body stores the extra calories as fat. When you eat fewer calories than your body needs, your body burns fat to get the energy it needs.  Calorie counting means keeping track of how many calories you eat and drink each day. Calorie counting can be helpful if you need to lose weight. If you make sure to eat fewer calories than your body needs, you should lose weight. Ask your health care provider what a healthy weight is for you.  For calorie counting to work, you will need to eat the right number of calories in a day in order to lose a healthy amount of weight per week. A dietitian can help you determine how many calories you need in a day and will give you suggestions on how to reach your calorie goal.  · A healthy amount of weight to lose per week is usually 1-2 lb (0.5-0.9 kg). This usually means that your daily calorie intake should be reduced by 500-750 calories.  · Eating 1,200 - 1,500 calories per day can help most women lose weight.  · Eating 1,500 - 1,800 calories per day can help most men lose weight.    What do I need to know about calorie counting?  In order to meet your daily calorie goal, you will need to:  · Find out how many calories are in each food you would like to eat. Try to do this before you eat.  · Decide how much of the food you plan to eat.  · Write down what you ate and how many calories it had. Doing this is called keeping a food log.    To successfully lose weight, it is important to balance calorie counting with a healthy lifestyle that includes regular activity. Aim for 150 minutes of moderate exercise (such as walking) or 75 minutes of vigorous exercise (such as running) each week.  Where do I find calorie information?    The number of calories in a food can be found on a  Nutrition Facts label. If a food does not have a Nutrition Facts label, try to look up the calories online or ask your dietitian for help.  Remember that calories are listed per serving. If you choose to have more than one serving of a food, you will have to multiply the calories per serving by the amount of servings you plan to eat. For example, the label on a package of bread might say that a serving size is 1 slice and that there are 90 calories in a serving. If you eat 1 slice, you will have eaten 90 calories. If you eat 2 slices, you will have eaten 180 calories.  How do I keep a food log?  Immediately after each meal, record the following information in your food log:  · What you ate. Don't forget to include toppings, sauces, and other extras on the food.  · How much you ate. This can be measured in cups, ounces, or number of items.  · How many calories each food and drink had.  · The total number of calories in the meal.    Keep your food log near you, such as in a small notebook in your pocket, or use a mobile eva or website. Some programs will calculate calories for you and show you how many calories you have left for the day to meet your goal.  What are some calorie counting tips?  · Use your calories on foods and drinks that will fill you up and not leave you hungry:  ? Some examples of foods that fill you up are nuts and nut butters, vegetables, lean proteins, and high-fiber foods like whole grains. High-fiber foods are foods with more than 5 g fiber per serving.  ? Drinks such as sodas, specialty coffee drinks, alcohol, and juices have a lot of calories, yet do not fill you up.  · Eat nutritious foods and avoid empty calories. Empty calories are calories you get from foods or beverages that do not have many vitamins or protein, such as candy, sweets, and soda. It is better to have a nutritious high-calorie food (such as an avocado) than a food with few nutrients (such as a bag of chips).  · Know how  "many calories are in the foods you eat most often. This will help you calculate calorie counts faster.  · Pay attention to calories in drinks. Low-calorie drinks include water and unsweetened drinks.  · Pay attention to nutrition labels for \"low fat\" or \"fat free\" foods. These foods sometimes have the same amount of calories or more calories than the full fat versions. They also often have added sugar, starch, or salt, to make up for flavor that was removed with the fat.  · Find a way of tracking calories that works for you. Get creative. Try different apps or programs if writing down calories does not work for you.  What are some portion control tips?  · Know how many calories are in a serving. This will help you know how many servings of a certain food you can have.  · Use a measuring cup to measure serving sizes. You could also try weighing out portions on a kitchen scale. With time, you will be able to estimate serving sizes for some foods.  · Take some time to put servings of different foods on your favorite plates, bowls, and cups so you know what a serving looks like.  · Try not to eat straight from a bag or box. Doing this can lead to overeating. Put the amount you would like to eat in a cup or on a plate to make sure you are eating the right portion.  · Use smaller plates, glasses, and bowls to prevent overeating.  · Try not to multitask (for example, watch TV or use your computer) while eating. If it is time to eat, sit down at a table and enjoy your food. This will help you to know when you are full. It will also help you to be aware of what you are eating and how much you are eating.  What are tips for following this plan?  Reading food labels  · Check the calorie count compared to the serving size. The serving size may be smaller than what you are used to eating.  · Check the source of the calories. Make sure the food you are eating is high in vitamins and protein and low in saturated and trans " fats.  Shopping  · Read nutrition labels while you shop. This will help you make healthy decisions before you decide to purchase your food.  · Make a grocery list and stick to it.  Cooking  · Try to cook your favorite foods in a healthier way. For example, try baking instead of frying.  · Use low-fat dairy products.  Meal planning  · Use more fruits and vegetables. Half of your plate should be fruits and vegetables.  · Include lean proteins like poultry and fish.  How do I count calories when eating out?  · Ask for smaller portion sizes.  · Consider sharing an entree and sides instead of getting your own entree.  · If you get your own entree, eat only half. Ask for a box at the beginning of your meal and put the rest of your entree in it so you are not tempted to eat it.  · If calories are listed on the menu, choose the lower calorie options.  · Choose dishes that include vegetables, fruits, whole grains, low-fat dairy products, and lean protein.  · Choose items that are boiled, broiled, grilled, or steamed. Stay away from items that are buttered, battered, fried, or served with cream sauce. Items labeled “crispy” are usually fried, unless stated otherwise.  · Choose water, low-fat milk, unsweetened iced tea, or other drinks without added sugar. If you want an alcoholic beverage, choose a lower calorie option such as a glass of wine or light beer.  · Ask for dressings, sauces, and syrups on the side. These are usually high in calories, so you should limit the amount you eat.  · If you want a salad, choose a garden salad and ask for grilled meats. Avoid extra toppings like pennington, cheese, or fried items. Ask for the dressing on the side, or ask for olive oil and vinegar or lemon to use as dressing.  · Estimate how many servings of a food you are given. For example, a serving of cooked rice is ½ cup or about the size of half a baseball. Knowing serving sizes will help you be aware of how much food you are eating at  restaurants. The list below tells you how big or small some common portion sizes are based on everyday objects:  ? 1 oz--4 stacked dice.  ? 3 oz--1 deck of cards.  ? 1 tsp--1 die.  ? 1 Tbsp--½ a ping-pong ball.  ? 2 Tbsp--1 ping-pong ball.  ? ½ cup--½ baseball.  ? 1 cup--1 baseball.  Summary  · Calorie counting means keeping track of how many calories you eat and drink each day. If you eat fewer calories than your body needs, you should lose weight.  · A healthy amount of weight to lose per week is usually 1-2 lb (0.5-0.9 kg). This usually means reducing your daily calorie intake by 500-750 calories.  · The number of calories in a food can be found on a Nutrition Facts label. If a food does not have a Nutrition Facts label, try to look up the calories online or ask your dietitian for help.  · Use your calories on foods and drinks that will fill you up, and not on foods and drinks that will leave you hungry.  · Use smaller plates, glasses, and bowls to prevent overeating.  This information is not intended to replace advice given to you by your health care provider. Make sure you discuss any questions you have with your health care provider.  Document Released: 12/18/2006 Document Revised: 11/17/2017 Document Reviewed: 11/17/2017  [employer] Interactive Patient Education © 2018 [employer]    Food Choices for Gastroesophageal Reflux Disease, Adult  When you have gastroesophageal reflux disease (GERD), the foods you eat and your eating habits are very important. Choosing the right foods can help ease the discomfort of GERD. Consider working with a diet and nutrition specialist (dietitian) to help you make healthy food choices.  What general guidelines should I follow?  Eating plan  · Choose healthy foods low in fat, such as fruits, vegetables, whole grains, low-fat dairy products, and lean meat, fish, and poultry.  · Eat frequent, small meals instead of three large meals each day. Eat your meals slowly, in a relaxed  setting. Avoid bending over or lying down until 2-3 hours after eating.  · Limit high-fat foods such as fatty meats or fried foods.  · Limit your intake of oils, butter, and shortening to less than 8 teaspoons each day.  · Avoid the following:  ? Foods that cause symptoms. These may be different for different people. Keep a food diary to keep track of foods that cause symptoms.  ? Alcohol.  ? Drinking large amounts of liquid with meals.  ? Eating meals during the 2-3 hours before bed.  · Cook foods using methods other than frying. This may include baking, grilling, or broiling.  Lifestyle    · Maintain a healthy weight. Ask your health care provider what weight is healthy for you. If you need to lose weight, work with your health care provider to do so safely.  · Exercise for at least 30 minutes on 5 or more days each week, or as told by your health care provider.  · Avoid wearing clothes that fit tightly around your waist and chest.  · Do not use any products that contain nicotine or tobacco, such as cigarettes and e-cigarettes. If you need help quitting, ask your health care provider.  · Sleep with the head of your bed raised. Use a wedge under the mattress or blocks under the bed frame to raise the head of the bed.  What foods are not recommended?  The items listed may not be a complete list. Talk with your dietitian about what dietary choices are best for you.  Grains  Pastries or quick breads with added fat. French toast.  Vegetables  Deep fried vegetables. French fries. Any vegetables prepared with added fat. Any vegetables that cause symptoms. For some people this may include tomatoes and tomato products, chili peppers, onions and garlic, and horseradish.  Fruits  Any fruits prepared with added fat. Any fruits that cause symptoms. For some people this may include citrus fruits, such as oranges, grapefruit, pineapple, and jeaneth.  Meats and other protein foods  High-fat meats, such as fatty beef or pork, hot  dogs, ribs, ham, sausage, salami and pennington. Fried meat or protein, including fried fish and fried chicken. Nuts and nut butters.  Dairy  Whole milk and chocolate milk. Sour cream. Cream. Ice cream. Cream cheese. Milk shakes.  Beverages  Coffee and tea, with or without caffeine. Carbonated beverages. Sodas. Energy drinks. Fruit juice made with acidic fruits (such as orange or grapefruit). Tomato juice. Alcoholic drinks.  Fats and oils  Butter. Margarine. Shortening. Ghee.  Sweets and desserts  Chocolate and cocoa. Donuts.  Seasoning and other foods  Pepper. Peppermint and spearmint. Any condiments, herbs, or seasonings that cause symptoms. For some people, this may include lay, hot sauce, or vinegar-based salad dressings.  Summary  · When you have gastroesophageal reflux disease (GERD), food and lifestyle choices are very important to help ease the discomfort of GERD.  · Eat frequent, small meals instead of three large meals each day. Eat your meals slowly, in a relaxed setting. Avoid bending over or lying down until 2-3 hours after eating.  · Limit high-fat foods such as fatty meat or fried foods.  This information is not intended to replace advice given to you by your health care provider. Make sure you discuss any questions you have with your health care provider.  Document Released: 12/18/2006 Document Revised: 12/19/2017 Document Reviewed: 12/19/2017  [employer] Interactive Patient Education © 2018 [employer]    Managing Your Hypertension  Hypertension is commonly called high blood pressure. This is when the force of your blood pressing against the walls of your arteries is too strong. Arteries are blood vessels that carry blood from your heart throughout your body. Hypertension forces the heart to work harder to pump blood, and may cause the arteries to become narrow or stiff. Having untreated or uncontrolled hypertension can cause heart attack, stroke, kidney disease, and other problems.  What are blood  "pressure readings?  A blood pressure reading consists of a higher number over a lower number. Ideally, your blood pressure should be below 120/80. The first (\"top\") number is called the systolic pressure. It is a measure of the pressure in your arteries as your heart beats. The second (\"bottom\") number is called the diastolic pressure. It is a measure of the pressure in your arteries as the heart relaxes.  What does my blood pressure reading mean?  Blood pressure is classified into four stages. Based on your blood pressure reading, your health care provider may use the following stages to determine what type of treatment you need, if any. Systolic pressure and diastolic pressure are measured in a unit called mm Hg.  Normal  · Systolic pressure: below 120.  · Diastolic pressure: below 80.  Elevated  · Systolic pressure: 120-129.  · Diastolic pressure: below 80.  Hypertension stage 1  · Systolic pressure: 130-139.  · Diastolic pressure: 80-89.  Hypertension stage 2  · Systolic pressure: 140 or above.  · Diastolic pressure: 90 or above.  What health risks are associated with hypertension?  Managing your hypertension is an important responsibility. Uncontrolled hypertension can lead to:  · A heart attack.  · A stroke.  · A weakened blood vessel (aneurysm).  · Heart failure.  · Kidney damage.  · Eye damage.  · Metabolic syndrome.  · Memory and concentration problems.    What changes can I make to manage my hypertension?  Hypertension can be managed by making lifestyle changes and possibly by taking medicines. Your health care provider will help you make a plan to bring your blood pressure within a normal range.  Eating and drinking  · Eat a diet that is high in fiber and potassium, and low in salt (sodium), added sugar, and fat. An example eating plan is called the DASH (Dietary Approaches to Stop Hypertension) diet. To eat this way:  ? Eat plenty of fresh fruits and vegetables. Try to fill half of your plate at each " meal with fruits and vegetables.  ? Eat whole grains, such as whole wheat pasta, brown rice, or whole grain bread. Fill about one quarter of your plate with whole grains.  ? Eat low-fat diary products.  ? Avoid fatty cuts of meat, processed or cured meats, and poultry with skin. Fill about one quarter of your plate with lean proteins such as fish, chicken without skin, beans, eggs, and tofu.  ? Avoid premade and processed foods. These tend to be higher in sodium, added sugar, and fat.  · Reduce your daily sodium intake. Most people with hypertension should eat less than 1,500 mg of sodium a day.  · Limit alcohol intake to no more than 1 drink a day for nonpregnant women and 2 drinks a day for men. One drink equals 12 oz of beer, 5 oz of wine, or 1½ oz of hard liquor.  Lifestyle  · Work with your health care provider to maintain a healthy body weight, or to lose weight. Ask what an ideal weight is for you.  · Get at least 30 minutes of exercise that causes your heart to beat faster (aerobic exercise) most days of the week. Activities may include walking, swimming, or biking.  · Include exercise to strengthen your muscles (resistance exercise), such as weight lifting, as part of your weekly exercise routine. Try to do these types of exercises for 30 minutes at least 3 days a week.  · Do not use any products that contain nicotine or tobacco, such as cigarettes and e-cigarettes. If you need help quitting, ask your health care provider.  · Control any long-term (chronic) conditions you have, such as high cholesterol or diabetes.  Monitoring  · Monitor your blood pressure at home as told by your health care provider. Your personal target blood pressure may vary depending on your medical conditions, your age, and other factors.  · Have your blood pressure checked regularly, as often as told by your health care provider.  Working with your health care provider  · Review all the medicines you take with your health care  provider because there may be side effects or interactions.  · Talk with your health care provider about your diet, exercise habits, and other lifestyle factors that may be contributing to hypertension.  · Visit your health care provider regularly. Your health care provider can help you create and adjust your plan for managing hypertension.  Will I need medicine to control my blood pressure?  Your health care provider may prescribe medicine if lifestyle changes are not enough to get your blood pressure under control, and if:  · Your systolic blood pressure is 130 or higher.  · Your diastolic blood pressure is 80 or higher.    Take medicines only as told by your health care provider. Follow the directions carefully. Blood pressure medicines must be taken as prescribed. The medicine does not work as well when you skip doses. Skipping doses also puts you at risk for problems.  Contact a health care provider if:  · You think you are having a reaction to medicines you have taken.  · You have repeated (recurrent) headaches.  · You feel dizzy.  · You have swelling in your ankles.  · You have trouble with your vision.  Get help right away if:  · You develop a severe headache or confusion.  · You have unusual weakness or numbness, or you feel faint.  · You have severe pain in your chest or abdomen.  · You vomit repeatedly.  · You have trouble breathing.  Summary  · Hypertension is when the force of blood pumping through your arteries is too strong. If this condition is not controlled, it may put you at risk for serious complications.  · Your personal target blood pressure may vary depending on your medical conditions, your age, and other factors. For most people, a normal blood pressure is less than 120/80.  · Hypertension is managed by lifestyle changes, medicines, or both. Lifestyle changes include weight loss, eating a healthy, low-sodium diet, exercising more, and limiting alcohol.  This information is not intended to  replace advice given to you by your health care provider. Make sure you discuss any questions you have with your health care provider.  Document Released: 09/11/2013 Document Revised: 11/15/2017 Document Reviewed: 11/15/2017  [email or web address] Interactive Patient Education © 2018 [employer]

## 2019-04-23 ENCOUNTER — OFFICE VISIT (OUTPATIENT)
Dept: FAMILY MEDICINE CLINIC | Facility: CLINIC | Age: 24
End: 2019-04-23

## 2019-04-23 VITALS
HEIGHT: 69 IN | DIASTOLIC BLOOD PRESSURE: 88 MMHG | WEIGHT: 224 LBS | BODY MASS INDEX: 33.18 KG/M2 | TEMPERATURE: 99.5 F | HEART RATE: 91 BPM | SYSTOLIC BLOOD PRESSURE: 120 MMHG | OXYGEN SATURATION: 99 %

## 2019-04-23 DIAGNOSIS — R10.84 GENERALIZED ABDOMINAL PAIN: Primary | ICD-10-CM

## 2019-04-23 DIAGNOSIS — R11.0 NAUSEA: ICD-10-CM

## 2019-04-23 LAB
BILIRUB BLD-MCNC: NEGATIVE MG/DL
CLARITY, POC: ABNORMAL
COLOR UR: YELLOW
GLUCOSE UR STRIP-MCNC: NEGATIVE MG/DL
KETONES UR QL: ABNORMAL
LEUKOCYTE EST, POC: NEGATIVE
NITRITE UR-MCNC: NEGATIVE MG/ML
PH UR: 5 [PH] (ref 5–8)
PROT UR STRIP-MCNC: ABNORMAL MG/DL
RBC # UR STRIP: NEGATIVE /UL
SP GR UR: 1.03 (ref 1–1.03)
UROBILINOGEN UR QL: NORMAL

## 2019-04-23 PROCEDURE — 81003 URINALYSIS AUTO W/O SCOPE: CPT | Performed by: NURSE PRACTITIONER

## 2019-04-23 PROCEDURE — 99213 OFFICE O/P EST LOW 20 MIN: CPT | Performed by: NURSE PRACTITIONER

## 2019-04-23 RX ORDER — ONDANSETRON 4 MG/1
TABLET, FILM COATED ORAL
Refills: 0 | COMMUNITY
Start: 2019-04-11 | End: 2020-03-17

## 2019-04-23 NOTE — PROGRESS NOTES
Subjective   Sandy Stephenson is a 23 y.o. female.     Chief Complaint: Nausea    Abdominal Pain   This is a new problem. The current episode started 1 to 4 weeks ago. The problem occurs daily. The problem has been unchanged. The pain is located in the RLQ and LLQ. The pain is moderate. The quality of the pain is sharp. The abdominal pain does not radiate. Associated symptoms include headaches and nausea. Pertinent negatives include no dysuria. Associated symptoms comments: Decreased appetite. Nothing aggravates the pain. The pain is relieved by nothing. She has tried proton pump inhibitors for the symptoms. The treatment provided no relief.        Family History   Problem Relation Age of Onset   • Hypertension Mother    • Thyroid disease Mother    • Cancer Mother         breast   • No Known Problems Father         car wreck    • Breast cancer Paternal Grandmother    • Hypertension Paternal Grandmother    • Arthritis Paternal Grandmother    • Cancer Paternal Grandmother         Breast       Social History     Socioeconomic History   • Marital status: Single     Spouse name: Not on file   • Number of children: Not on file   • Years of education: Not on file   • Highest education level: Not on file   Tobacco Use   • Smoking status: Current Every Day Smoker     Packs/day: 1.00     Types: Cigarettes   • Smokeless tobacco: Never Used   Substance and Sexual Activity   • Alcohol use: No   • Drug use: No   • Sexual activity: Defer     Birth control/protection: None       Past Medical History:   Diagnosis Date   • Headache    • Hypertension    • Obesity        Review of Systems   Constitutional: Positive for appetite change.   Respiratory: Negative.    Cardiovascular: Negative.    Gastrointestinal: Positive for abdominal pain and nausea.   Genitourinary: Negative for dysuria and menstrual problem.   Musculoskeletal: Negative.    Skin: Negative.    Neurological: Positive for headaches.   Psychiatric/Behavioral: Negative.   "      Objective   Physical Exam   Constitutional: She is oriented to person, place, and time. She appears well-developed and well-nourished.   Neck: Normal range of motion. Neck supple.   Cardiovascular: Normal rate, regular rhythm and normal heart sounds.   Pulmonary/Chest: Effort normal and breath sounds normal.   Abdominal: Soft. Bowel sounds are normal. She exhibits no distension and no mass. There is tenderness (RLQ and LLQ). There is no rebound and no guarding.   Neurological: She is alert and oriented to person, place, and time.   Skin: Skin is warm and dry.   Psychiatric: She has a normal mood and affect. Her behavior is normal. Judgment and thought content normal.   Nursing note and vitals reviewed.      Procedures    Vitals: Blood pressure 120/88, pulse 91, temperature 99.5 °F (37.5 °C), temperature source Oral, height 175.3 cm (69\"), weight 102 kg (224 lb), SpO2 99 %, not currently breastfeeding.    Allergies: No Known Allergies     During this visit the following were done:  Labs Reviewed []    Labs Ordered []    Radiology Reports Reviewed []    Radiology Ordered []    PCP Records Reviewed []    Referring Provider Records Reviewed []    ER Records Reviewed []    Hospital Records Reviewed []    History Obtained From Family []    Radiology Images Reviewed []    Other Reviewed []    Records Requested []      Assessment/Plan   Sandy was seen today for nausea.    Diagnoses and all orders for this visit:    Generalized abdominal pain  -     CT Abdomen Pelvis Without Contrast; Future    Nausea  -     CT Abdomen Pelvis Without Contrast; Future               "

## 2019-08-14 ENCOUNTER — OFFICE VISIT (OUTPATIENT)
Dept: FAMILY MEDICINE CLINIC | Facility: CLINIC | Age: 24
End: 2019-08-14

## 2019-08-14 VITALS
HEART RATE: 85 BPM | OXYGEN SATURATION: 98 % | SYSTOLIC BLOOD PRESSURE: 120 MMHG | BODY MASS INDEX: 33.77 KG/M2 | WEIGHT: 228 LBS | HEIGHT: 69 IN | TEMPERATURE: 99.1 F | DIASTOLIC BLOOD PRESSURE: 80 MMHG

## 2019-08-14 DIAGNOSIS — F41.1 GAD (GENERALIZED ANXIETY DISORDER): ICD-10-CM

## 2019-08-14 DIAGNOSIS — I10 ESSENTIAL HYPERTENSION: ICD-10-CM

## 2019-08-14 DIAGNOSIS — N83.201 CYST OF RIGHT OVARY: Primary | ICD-10-CM

## 2019-08-14 DIAGNOSIS — K21.9 GASTROESOPHAGEAL REFLUX DISEASE, ESOPHAGITIS PRESENCE NOT SPECIFIED: ICD-10-CM

## 2019-08-14 PROCEDURE — 99214 OFFICE O/P EST MOD 30 MIN: CPT | Performed by: NURSE PRACTITIONER

## 2019-08-14 RX ORDER — OMEPRAZOLE 40 MG/1
40 CAPSULE, DELAYED RELEASE ORAL DAILY
Qty: 30 CAPSULE | Refills: 2 | Status: SHIPPED | OUTPATIENT
Start: 2019-08-14 | End: 2020-07-14 | Stop reason: SDUPTHER

## 2019-08-14 RX ORDER — LISINOPRIL 20 MG/1
20 TABLET ORAL DAILY
Qty: 30 TABLET | Refills: 5 | Status: SHIPPED | OUTPATIENT
Start: 2019-08-14 | End: 2020-04-20

## 2019-08-14 NOTE — PROGRESS NOTES
Subjective   Sandy Stephenson is a 24 y.o. female.     Chief Complaint: Follow-up (recent CT ) and Heartburn    Heartburn   She complains of heartburn. She reports no chest pain. This is a chronic problem. The current episode started more than 1 month ago. The problem occurs occasionally. The problem has been waxing and waning. The heartburn does not wake her from sleep. The heartburn does not limit her activity. The heartburn doesn't change with position. The symptoms are aggravated by certain foods. Risk factors include obesity and hiatal hernia. She has tried a PPI for the symptoms. The treatment provided no relief.   Hypertension   This is a chronic problem. The current episode started more than 1 month ago. The problem is controlled. Associated symptoms include anxiety. Pertinent negatives include no chest pain, palpitations, peripheral edema or shortness of breath. There are no associated agents to hypertension. Risk factors for coronary artery disease include obesity. Current antihypertension treatment includes ACE inhibitors. The current treatment provides significant improvement. Compliance problems include exercise and diet.    Anxiety   Presents for initial visit. Onset was 1 to 5 years ago. The problem has been gradually worsening. Symptoms include depressed mood, excessive worry, insomnia, irritability, nervous/anxious behavior and panic. Patient reports no chest pain, palpitations, shortness of breath or suicidal ideas. Symptoms occur most days. The severity of symptoms is causing significant distress. Exacerbated by: school. The quality of sleep is fair. Nighttime awakenings: one to two.     Her past medical history is significant for anxiety/panic attacks. Past treatments include nothing.   Abdominal Pain   This is a chronic problem. The current episode started 4 months ago. The problem occurs daily. The problem has been unchanged. The pain is located in the RLQ. The pain is moderate. The quality of the  pain is sharp. The abdominal pain does not radiate. Associated symptoms include headaches and nausea. Pertinent negatives include no dysuria. Associated symptoms comments: Decreased appetite. Nothing aggravates the pain. The pain is relieved by nothing. She has tried proton pump inhibitors for the symptoms. The treatment provided no relief.   CT of abd/pelvis was ordered at previous visit and insurance would not cover the testing.  She went to [facility] on 6/3/2019 for abdominal pain and had CT of abd/pelvis with the following findings:  Right adnexal lesion, favor a benign functioning cyst.  US was recommended in 2-3 months.  She does continue to have some mild pain in her right pelvic area.         Family History   Problem Relation Age of Onset   • Hypertension Mother    • Thyroid disease Mother    • Cancer Mother         breast   • No Known Problems Father         car wreck    • Breast cancer Paternal Grandmother    • Hypertension Paternal Grandmother    • Arthritis Paternal Grandmother    • Cancer Paternal Grandmother         Breast       Social History     Socioeconomic History   • Marital status: Single     Spouse name: Not on file   • Number of children: Not on file   • Years of education: Not on file   • Highest education level: Not on file   Tobacco Use   • Smoking status: Current Every Day Smoker     Packs/day: 1.00     Types: Cigarettes   • Smokeless tobacco: Never Used   Substance and Sexual Activity   • Alcohol use: No   • Drug use: No   • Sexual activity: No     Birth control/protection: None       Past Medical History:   Diagnosis Date   • Headache    • Hypertension    • Obesity        Review of Systems   Constitutional: Positive for irritability.   HENT: Negative.    Respiratory: Negative.  Negative for shortness of breath.    Cardiovascular: Negative.  Negative for chest pain and palpitations.   Gastrointestinal: Positive for heartburn.   Musculoskeletal: Negative.    Skin: Negative.   "  Neurological: Negative.    Psychiatric/Behavioral: Negative for suicidal ideas. The patient is nervous/anxious and has insomnia.        Objective   Physical Exam   Constitutional: She is oriented to person, place, and time. She appears well-developed and well-nourished.   Neck: Normal range of motion. Neck supple.   Cardiovascular: Normal rate, regular rhythm and normal heart sounds.   Pulmonary/Chest: Effort normal and breath sounds normal.   Neurological: She is alert and oriented to person, place, and time.   Skin: Skin is warm and dry.   Psychiatric: She has a normal mood and affect. Her behavior is normal. Judgment and thought content normal.   Nursing note and vitals reviewed.      Procedures    Vitals: Blood pressure 120/80, pulse 85, temperature 99.1 °F (37.3 °C), temperature source Oral, height 175.3 cm (69\"), weight 103 kg (228 lb), SpO2 98 %, not currently breastfeeding.    Allergies: No Known Allergies     During this visit the following were done:  Labs Reviewed []    Labs Ordered []    Radiology Reports Reviewed []    Radiology Ordered []    PCP Records Reviewed []    Referring Provider Records Reviewed []    ER Records Reviewed []    Hospital Records Reviewed []    History Obtained From Family []    Radiology Images Reviewed []    Other Reviewed []    Records Requested []      Assessment/Plan   Sandy was seen today for follow-up and heartburn.    Diagnoses and all orders for this visit:    Cyst of right ovary  -     US Pelvis Complete; Future    FREDI (generalized anxiety disorder)  -     Start sertraline (ZOLOFT) 50 MG tablet; Take 1 tablet by mouth Daily.    Essential hypertension  -     Continue lisinopril (PRINIVIL,ZESTRIL) 20 MG tablet; Take 1 tablet by mouth Daily. for blood pressure.    Gastroesophageal reflux disease, esophagitis presence not specified  -     Start omeprazole (priLOSEC) 40 MG capsule; Take 1 capsule by mouth Daily.   Stop pantoprazole as it is not helping with " symptoms

## 2019-10-24 ENCOUNTER — HOSPITAL ENCOUNTER (OUTPATIENT)
Dept: ULTRASOUND IMAGING | Facility: HOSPITAL | Age: 24
Discharge: HOME | End: 2019-10-24
Admitting: NURSE PRACTITIONER

## 2019-10-24 DIAGNOSIS — N83.201 CYST OF RIGHT OVARY: ICD-10-CM

## 2019-10-24 PROCEDURE — 76856 US EXAM PELVIC COMPLETE: CPT | Performed by: RADIOLOGY

## 2019-10-24 PROCEDURE — 76856 US EXAM PELVIC COMPLETE: CPT

## 2019-10-25 ENCOUNTER — TELEPHONE (OUTPATIENT)
Dept: FAMILY MEDICINE CLINIC | Facility: CLINIC | Age: 24
End: 2019-10-25

## 2019-10-25 DIAGNOSIS — N83.202 CYSTS OF BOTH OVARIES: Primary | ICD-10-CM

## 2019-10-25 DIAGNOSIS — N83.201 CYSTS OF BOTH OVARIES: Primary | ICD-10-CM

## 2019-10-25 NOTE — TELEPHONE ENCOUNTER
----- Message from LUIS MIGUEL Roberson sent at 10/25/2019 10:02 AM EDT -----  Patient does have bilateral ovarian cysts.  If she is having issues with pain in her pelvic area I would recommend referral to gynecology.  Issue agreeable to this?  Does she have a preference on who she sees?      Grandmother Irais was notified & verblalized understanding,prefers [facility].

## 2019-10-25 NOTE — PROGRESS NOTES
Patient does have bilateral ovarian cysts.  If she is having issues with pain in her pelvic area I would recommend referral to gynecology.  Issue agreeable to this?  Does she have a preference on who she sees?

## 2020-03-13 ENCOUNTER — OFFICE VISIT (OUTPATIENT)
Dept: FAMILY MEDICINE CLINIC | Facility: CLINIC | Age: 25
End: 2020-03-13

## 2020-03-13 VITALS
BODY MASS INDEX: 33.62 KG/M2 | HEIGHT: 69 IN | HEART RATE: 82 BPM | TEMPERATURE: 99 F | DIASTOLIC BLOOD PRESSURE: 98 MMHG | SYSTOLIC BLOOD PRESSURE: 130 MMHG | WEIGHT: 227 LBS | OXYGEN SATURATION: 98 %

## 2020-03-13 DIAGNOSIS — J06.9 VIRAL URI WITH COUGH: Primary | ICD-10-CM

## 2020-03-13 DIAGNOSIS — R50.9 FEVER, UNSPECIFIED FEVER CAUSE: ICD-10-CM

## 2020-03-13 DIAGNOSIS — F41.1 GAD (GENERALIZED ANXIETY DISORDER): ICD-10-CM

## 2020-03-13 DIAGNOSIS — R11.2 NON-INTRACTABLE VOMITING WITH NAUSEA, UNSPECIFIED VOMITING TYPE: ICD-10-CM

## 2020-03-13 DIAGNOSIS — R10.11 RUQ ABDOMINAL PAIN: ICD-10-CM

## 2020-03-13 LAB
EXPIRATION DATE: NORMAL
FLUAV AG NPH QL: NEGATIVE
FLUBV AG NPH QL: NEGATIVE
INTERNAL CONTROL: NORMAL
Lab: NORMAL

## 2020-03-13 PROCEDURE — 99214 OFFICE O/P EST MOD 30 MIN: CPT | Performed by: NURSE PRACTITIONER

## 2020-03-13 PROCEDURE — 87804 INFLUENZA ASSAY W/OPTIC: CPT | Performed by: NURSE PRACTITIONER

## 2020-03-13 RX ORDER — PSEUDOEPHEDRINE HYDROCHLORIDE 60 MG/1
60 TABLET, FILM COATED ORAL EVERY 6 HOURS PRN
Qty: 30 TABLET | Refills: 0 | Status: SHIPPED | OUTPATIENT
Start: 2020-03-13 | End: 2020-06-01

## 2020-03-13 RX ORDER — ESCITALOPRAM OXALATE 10 MG/1
10 TABLET ORAL DAILY
Qty: 30 TABLET | Refills: 2 | Status: SHIPPED | OUTPATIENT
Start: 2020-03-13 | End: 2020-07-08

## 2020-03-13 NOTE — PROGRESS NOTES
Subjective   Sandy Stephenson is a 24 y.o. female.     Chief Complaint: Cough and Fever    Cough   This is a new problem. The current episode started in the past 7 days. The problem has been unchanged. The problem occurs every few minutes. The cough is productive of sputum. Associated symptoms include ear pain, a fever (low grade), headaches and postnasal drip. Pertinent negatives include no sore throat. Associated symptoms comments: Abdominal pain. Nothing aggravates the symptoms. She has tried nothing for the symptoms.   Nausea   This is a new problem. The current episode started more than 1 month ago. The problem occurs daily. The problem has been unchanged. Associated symptoms include coughing, a fever (low grade), headaches, nausea and vomiting. Pertinent negatives include no sore throat. The symptoms are aggravated by eating.   Patient states that she does get nauseated after eating meals.  She also complains of sharp stabbing pains in her right upper quadrant of abdomen intermittently.  Anxiety  Patient has had anxiety issues for the past several months.  She was started on Zoloft approximately 8 months ago.  She states that the medication did not seem to help with her anxiety.  She stopped taking it over a month ago.  She continues to have issues with anxiety and mild depression.  She denies any suicidal thoughts.  She would like to try different medication for her anxiety today.    Family History   Problem Relation Age of Onset   • Hypertension Mother    • Thyroid disease Mother    • Cancer Mother         breast   • No Known Problems Father         car wreck    • Breast cancer Paternal Grandmother    • Hypertension Paternal Grandmother    • Arthritis Paternal Grandmother    • Cancer Paternal Grandmother         Breast       Social History     Socioeconomic History   • Marital status: Single     Spouse name: Not on file   • Number of children: Not on file   • Years of education: Not on file   • Highest education  "level: Not on file   Tobacco Use   • Smoking status: Current Every Day Smoker     Packs/day: 1.00     Types: Cigarettes   • Smokeless tobacco: Never Used   Substance and Sexual Activity   • Alcohol use: No   • Drug use: No   • Sexual activity: Never     Birth control/protection: None       Past Medical History:   Diagnosis Date   • Headache    • Hypertension    • Obesity        Review of Systems   Constitutional: Positive for fever (low grade).   HENT: Positive for ear pain and postnasal drip. Negative for sore throat.    Respiratory: Positive for cough.    Cardiovascular: Negative.    Gastrointestinal: Positive for nausea and vomiting.   Musculoskeletal: Negative.    Skin: Negative.    Neurological: Positive for headaches.   Psychiatric/Behavioral: Negative.        Objective   Physical Exam   Constitutional: She is oriented to person, place, and time. She appears well-developed and well-nourished.   HENT:   Right Ear: External ear normal.   Left Ear: External ear normal.   Mouth/Throat: Oropharynx is clear and moist.   Eyes: Conjunctivae are normal.   Neck: Normal range of motion. Neck supple.   Cardiovascular: Normal rate, regular rhythm and normal heart sounds.   Pulmonary/Chest: Effort normal and breath sounds normal.   Abdominal: Soft. Bowel sounds are normal. She exhibits no distension and no mass. There is tenderness (RUQ).   Lymphadenopathy:     She has no cervical adenopathy.   Neurological: She is alert and oriented to person, place, and time.   Skin: Skin is warm and dry.   Psychiatric: She has a normal mood and affect. Her behavior is normal. Judgment and thought content normal.   Nursing note and vitals reviewed.      Procedures    Vitals: Blood pressure 130/98, pulse 82, temperature 99 °F (37.2 °C), temperature source Oral, height 175.3 cm (69\"), weight 103 kg (227 lb), SpO2 98 %, not currently breastfeeding.    Body mass index is 33.52 kg/m².     Allergies: No Known Allergies     During this visit the " following were done:  Labs Reviewed []    Labs Ordered []    Radiology Reports Reviewed []    Radiology Ordered []    PCP Records Reviewed []    Referring Provider Records Reviewed []    ER Records Reviewed []    Hospital Records Reviewed []    History Obtained From Family []    Radiology Images Reviewed []    Other Reviewed []    Records Requested []      Assessment/Plan   Sandy was seen today for cough and fever.    Diagnoses and all orders for this visit:    Viral URI with cough  -     POCT Influenza A/B  -     pseudoephedrine (SUDAFED) 60 MG tablet; Take 1 tablet by mouth Every 6 (Six) Hours As Needed for Congestion.    Fever, unspecified fever cause  -     POCT Influenza A/B    RUQ abdominal pain  -     US Gallbladder; Future    Non-intractable vomiting with nausea, unspecified vomiting type  -     US Gallbladder; Future    FREDI (generalized anxiety disorder)  -     Start escitalopram (LEXAPRO) 10 MG tablet; Take 1 tablet by mouth Daily.   Patient to return to the office in 3 weeks for reevaluation of anxiety and efficacy of Lexapro

## 2020-03-16 ENCOUNTER — TELEPHONE (OUTPATIENT)
Dept: FAMILY MEDICINE CLINIC | Facility: CLINIC | Age: 25
End: 2020-03-16

## 2020-03-16 NOTE — TELEPHONE ENCOUNTER
Irais called reports patient thought you were going to send her in something for Nausea & pain?,please advise.

## 2020-03-17 ENCOUNTER — HOSPITAL ENCOUNTER (OUTPATIENT)
Dept: ULTRASOUND IMAGING | Facility: HOSPITAL | Age: 25
Discharge: HOME | End: 2020-03-17
Admitting: NURSE PRACTITIONER

## 2020-03-17 ENCOUNTER — HOSPITAL ENCOUNTER (OUTPATIENT)
Dept: ULTRASOUND IMAGING | Facility: HOSPITAL | Age: 25
Discharge: HOME | End: 2020-03-17

## 2020-03-17 DIAGNOSIS — R10.11 RUQ ABDOMINAL PAIN: ICD-10-CM

## 2020-03-17 DIAGNOSIS — R11.2 NON-INTRACTABLE VOMITING WITH NAUSEA, UNSPECIFIED VOMITING TYPE: ICD-10-CM

## 2020-03-17 PROCEDURE — 76705 ECHO EXAM OF ABDOMEN: CPT | Performed by: RADIOLOGY

## 2020-03-17 PROCEDURE — 76705 ECHO EXAM OF ABDOMEN: CPT

## 2020-03-17 RX ORDER — ONDANSETRON 4 MG/1
4 TABLET, FILM COATED ORAL EVERY 8 HOURS PRN
Qty: 30 TABLET | Refills: 0 | Status: SHIPPED | OUTPATIENT
Start: 2020-03-17 | End: 2020-06-01

## 2020-03-17 NOTE — TELEPHONE ENCOUNTER
Sent her a script for zofran for nausea.  She will need to do a BRAT diet untiil we see if this is gallbladder issue.    Irais notified & verbalized understanding.

## 2020-03-18 ENCOUNTER — TELEPHONE (OUTPATIENT)
Dept: FAMILY MEDICINE CLINIC | Facility: CLINIC | Age: 25
End: 2020-03-18

## 2020-03-19 ENCOUNTER — TELEPHONE (OUTPATIENT)
Dept: FAMILY MEDICINE CLINIC | Facility: CLINIC | Age: 25
End: 2020-03-19

## 2020-03-19 DIAGNOSIS — R10.11 RUQ ABDOMINAL PAIN: Primary | ICD-10-CM

## 2020-03-19 DIAGNOSIS — R11.2 NON-INTRACTABLE VOMITING WITH NAUSEA, UNSPECIFIED VOMITING TYPE: ICD-10-CM

## 2020-03-19 NOTE — TELEPHONE ENCOUNTER
Irais dewey reports patient is getting worse,everything she eats bothers her,G/B US results are not recorded as of yet.

## 2020-03-19 NOTE — TELEPHONE ENCOUNTER
Report is back.  No cholelithiasis.  HIDA scan order placed in epic    Irais notified & verbalized understanding.

## 2020-03-20 ENCOUNTER — TELEPHONE (OUTPATIENT)
Dept: FAMILY MEDICINE CLINIC | Facility: CLINIC | Age: 25
End: 2020-03-20

## 2020-03-20 NOTE — TELEPHONE ENCOUNTER
Irais called states [facility] called for hida scan and said they would not be doing any until April.She states Sandy is worse not eating and is no better wants to know what she should do/ Please advise

## 2020-03-24 NOTE — TELEPHONE ENCOUNTER
OK.  Please let her know these circumstances is due to the Coronavirus.  If Sandy gets worse, please have her to go the ER.

## 2020-03-25 NOTE — TELEPHONE ENCOUNTER
Irais notified and has had her at the [facility] today and they are going to send her to urology for kidney stones. She said she is little better now since going to the e.r

## 2020-03-27 ENCOUNTER — TELEPHONE (OUTPATIENT)
Dept: FAMILY MEDICINE CLINIC | Facility: CLINIC | Age: 25
End: 2020-03-27

## 2020-03-27 NOTE — TELEPHONE ENCOUNTER
Patient has not had bowel movement for 4 days.Grandma wanted to let you know she is going to get some miralax and try to see if she goes.

## 2020-04-20 ENCOUNTER — HOSPITAL ENCOUNTER (OUTPATIENT)
Dept: NUCLEAR MEDICINE | Facility: HOSPITAL | Age: 25
Discharge: HOME | End: 2020-04-20

## 2020-04-20 DIAGNOSIS — R10.11 RUQ ABDOMINAL PAIN: ICD-10-CM

## 2020-04-20 DIAGNOSIS — I10 ESSENTIAL HYPERTENSION: ICD-10-CM

## 2020-04-20 DIAGNOSIS — R11.2 NON-INTRACTABLE VOMITING WITH NAUSEA, UNSPECIFIED VOMITING TYPE: ICD-10-CM

## 2020-04-20 PROCEDURE — 78227 HEPATOBIL SYST IMAGE W/DRUG: CPT | Performed by: RADIOLOGY

## 2020-04-20 PROCEDURE — 25010000002 SINCALIDE PER 5 MCG: Performed by: NURSE PRACTITIONER

## 2020-04-20 PROCEDURE — A9537 TC99M MEBROFENIN: HCPCS | Performed by: NURSE PRACTITIONER

## 2020-04-20 PROCEDURE — 78227 HEPATOBIL SYST IMAGE W/DRUG: CPT

## 2020-04-20 PROCEDURE — 0 TECHNETIUM TC 99M MEBROFENIN KIT: Performed by: NURSE PRACTITIONER

## 2020-04-20 RX ORDER — LISINOPRIL 20 MG/1
TABLET ORAL
Qty: 30 TABLET | Refills: 5 | Status: SHIPPED | OUTPATIENT
Start: 2020-04-20 | End: 2020-04-21 | Stop reason: SDUPTHER

## 2020-04-20 RX ORDER — KIT FOR THE PREPARATION OF TECHNETIUM TC 99M MEBROFENIN 45 MG/10ML
1 INJECTION, POWDER, LYOPHILIZED, FOR SOLUTION INTRAVENOUS
Status: COMPLETED | OUTPATIENT
Start: 2020-04-20 | End: 2020-04-20

## 2020-04-20 RX ADMIN — MEBROFENIN 1 DOSE: 45 INJECTION, POWDER, LYOPHILIZED, FOR SOLUTION INTRAVENOUS at 11:27

## 2020-04-20 RX ADMIN — SINCALIDE 4.1 MCG: 5 INJECTION, POWDER, LYOPHILIZED, FOR SOLUTION INTRAVENOUS at 12:23

## 2020-04-21 ENCOUNTER — TELEPHONE (OUTPATIENT)
Dept: FAMILY MEDICINE CLINIC | Facility: CLINIC | Age: 25
End: 2020-04-21

## 2020-04-21 DIAGNOSIS — I10 ESSENTIAL HYPERTENSION: ICD-10-CM

## 2020-04-21 DIAGNOSIS — R10.11 RUQ ABDOMINAL PAIN: Primary | ICD-10-CM

## 2020-04-21 DIAGNOSIS — R11.2 NON-INTRACTABLE VOMITING WITH NAUSEA, UNSPECIFIED VOMITING TYPE: ICD-10-CM

## 2020-04-21 RX ORDER — LISINOPRIL 20 MG/1
20 TABLET ORAL DAILY
Qty: 30 TABLET | Refills: 5 | Status: SHIPPED | OUTPATIENT
Start: 2020-04-21 | End: 2020-07-14 | Stop reason: SDUPTHER

## 2020-04-21 NOTE — PROGRESS NOTES
Her HIDA scan results shows only slightly below normal.  Is she still having symptoms at this time or have they resolved?

## 2020-04-21 NOTE — TELEPHONE ENCOUNTER
OK.  We can refer her to general surgery for evaluation if she would like.  Who would she like to see?

## 2020-04-21 NOTE — TELEPHONE ENCOUNTER
----- Message from LUIS MIGUEL Roberson sent at 4/21/2020  8:08 AM EDT -----  Her HIDA scan results shows only slightly below normal.  Is she still having symptoms at this time or have they resolved?      Still having a lot of pain and nausea

## 2020-05-29 ENCOUNTER — TRANSCRIBE ORDERS (OUTPATIENT)
Dept: ADMINISTRATIVE | Facility: HOSPITAL | Age: 25
End: 2020-05-29

## 2020-05-29 DIAGNOSIS — Z01.818 PRE-OPERATIVE CLEARANCE: Primary | ICD-10-CM

## 2020-06-01 ENCOUNTER — LAB (OUTPATIENT)
Dept: LAB | Facility: HOSPITAL | Age: 25
End: 2020-06-01

## 2020-06-01 ENCOUNTER — OFFICE VISIT (OUTPATIENT)
Dept: FAMILY MEDICINE CLINIC | Facility: CLINIC | Age: 25
End: 2020-06-01

## 2020-06-01 VITALS
TEMPERATURE: 98.2 F | BODY MASS INDEX: 33.33 KG/M2 | HEART RATE: 86 BPM | WEIGHT: 225 LBS | OXYGEN SATURATION: 98 % | SYSTOLIC BLOOD PRESSURE: 118 MMHG | DIASTOLIC BLOOD PRESSURE: 80 MMHG | HEIGHT: 69 IN

## 2020-06-01 DIAGNOSIS — Z01.818 PRE-OPERATIVE CLEARANCE: ICD-10-CM

## 2020-06-01 DIAGNOSIS — G44.52 NEW DAILY PERSISTENT HEADACHE: Primary | ICD-10-CM

## 2020-06-01 DIAGNOSIS — Z86.69 HX OF MIGRAINES: ICD-10-CM

## 2020-06-01 LAB
REF LAB TEST METHOD: NORMAL
SARS-COV-2 RNA RESP QL NAA+PROBE: NOT DETECTED

## 2020-06-01 PROCEDURE — 99213 OFFICE O/P EST LOW 20 MIN: CPT | Performed by: NURSE PRACTITIONER

## 2020-06-01 NOTE — PROGRESS NOTES
Subjective   Sandy Stephenson is a 25 y.o. female.   Chief Compliant: The patient presents with Headache    Headache    Chronicity: HX migraines.  Now with almost daily headaches.  Left sided and now has noted a knot on her head. The current episode started 1 to 4 weeks ago. The problem occurs daily. The problem has been waxing and waning. The pain is located in the left unilateral region. The pain does not radiate. The pain quality is not similar to prior headaches. The quality of the pain is described as aching. The pain is at a severity of 5/10. The pain is moderate. Associated symptoms include scalp tenderness and a visual change. Pertinent negatives include no abdominal pain, abnormal behavior, blurred vision, dizziness, drainage, ear pain, eye pain, eye redness, eye watering, facial sweating, fever, hearing loss, insomnia, loss of balance, numbness, phonophobia, photophobia, rhinorrhea, seizures, sinus pressure, sore throat, swollen glands, tingling, tinnitus, vomiting, weakness or weight loss. Nothing aggravates the symptoms. She has tried NSAIDs for the symptoms. The treatment provided mild relief. Her past medical history is significant for hypertension and migraine headaches. There is no history of migraines in the family.      The following portions of the patient's history were reviewed and updated as appropriate: allergies, current medications, past family history, past medical history, past social history, past surgical history and problem list.      Review of Systems   Constitutional: Negative.  Negative for fever and weight loss.   HENT: Negative for congestion, ear pain, hearing loss, rhinorrhea, sinus pressure, sore throat and tinnitus.    Eyes: Negative for blurred vision, photophobia, pain and redness.   Respiratory: Negative.    Cardiovascular: Negative.    Gastrointestinal: Negative for abdominal pain and vomiting.   Neurological: Positive for headaches. Negative for dizziness, tingling, tremors,  "seizures, syncope, facial asymmetry, speech difficulty, weakness, light-headedness, numbness and loss of balance.   Psychiatric/Behavioral: The patient does not have insomnia.    All other systems reviewed and are negative.      Procedures    Vitals: Blood pressure 118/80, pulse 86, temperature 98.2 °F (36.8 °C), height 175.3 cm (69\"), weight 102 kg (225 lb), SpO2 98 %, not currently breastfeeding.     Allergies: No Known Allergies       Objective   Physical Exam   Constitutional: She is oriented to person, place, and time. She appears well-developed and well-nourished. No distress.   HENT:   Head: Normocephalic.   Right Ear: External ear normal.   Left Ear: External ear normal.   Nose: Nose normal.   Mouth/Throat: Oropharynx is clear and moist. No oropharyngeal exudate.   Eyes: Pupils are equal, round, and reactive to light. Conjunctivae and EOM are normal. Right eye exhibits no discharge. Left eye exhibits no discharge. No scleral icterus.   Neck: Neck supple. No thyromegaly present.   Cardiovascular: Normal rate, regular rhythm and normal heart sounds.   No murmur heard.  Pulmonary/Chest: Effort normal and breath sounds normal.   Musculoskeletal: Normal range of motion. She exhibits no deformity.   Lymphadenopathy:     She has no cervical adenopathy.   Neurological: She is alert and oriented to person, place, and time. She displays normal reflexes. No cranial nerve deficit or sensory deficit. She exhibits normal muscle tone. Coordination normal.   Skin: Skin is warm and dry. She is not diaphoretic.   Psychiatric: She has a normal mood and affect. Her behavior is normal. Judgment and thought content normal.   Nursing note and vitals reviewed.      During this visit the following were done:  Labs Reviewed []    Labs Ordered []    Radiology Reports Reviewed []    Radiology Ordered [x]    PCP Records Reviewed []    Referring Provider Records Reviewed []    ER Records Reviewed []    Hospital Records Reviewed []  "   History Obtained From Family []    Radiology Images Reviewed []    Other Reviewed []    Records Requested []      Assessment/Plan   Discussed with patient impression and plan, patient verbalizes understanding  Sandy was seen today for headache.    Diagnoses and all orders for this visit:    New daily persistent headache  -     CT Head Without Contrast; Future    Hx of migraines  -     CT Head Without Contrast; Future

## 2020-06-10 ENCOUNTER — HOSPITAL ENCOUNTER (OUTPATIENT)
Dept: CT IMAGING | Facility: HOSPITAL | Age: 25
Discharge: HOME | End: 2020-06-10
Admitting: NURSE PRACTITIONER

## 2020-06-10 DIAGNOSIS — G44.52 NEW DAILY PERSISTENT HEADACHE: ICD-10-CM

## 2020-06-10 DIAGNOSIS — Z86.69 HX OF MIGRAINES: ICD-10-CM

## 2020-06-10 PROCEDURE — 70450 CT HEAD/BRAIN W/O DYE: CPT

## 2020-06-10 PROCEDURE — 70450 CT HEAD/BRAIN W/O DYE: CPT | Performed by: RADIOLOGY

## 2020-06-23 ENCOUNTER — OFFICE VISIT (OUTPATIENT)
Dept: FAMILY MEDICINE CLINIC | Facility: CLINIC | Age: 25
End: 2020-06-23

## 2020-06-23 VITALS — HEIGHT: 69 IN | OXYGEN SATURATION: 98 % | BODY MASS INDEX: 33.03 KG/M2 | TEMPERATURE: 97.5 F | WEIGHT: 223 LBS

## 2020-06-23 DIAGNOSIS — R00.2 PALPITATIONS: ICD-10-CM

## 2020-06-23 DIAGNOSIS — R55 SYNCOPE, UNSPECIFIED SYNCOPE TYPE: Primary | ICD-10-CM

## 2020-06-23 LAB
B-HCG UR QL: NEGATIVE
BILIRUB BLD-MCNC: NEGATIVE MG/DL
CLARITY, POC: CLEAR
COLOR UR: YELLOW
GLUCOSE UR STRIP-MCNC: NEGATIVE MG/DL
INTERNAL NEGATIVE CONTROL: NEGATIVE
INTERNAL POSITIVE CONTROL: POSITIVE
KETONES UR QL: ABNORMAL
LEUKOCYTE EST, POC: NEGATIVE
Lab: NORMAL
NITRITE UR-MCNC: NEGATIVE MG/ML
PH UR: 6 [PH] (ref 5–8)
PROT UR STRIP-MCNC: ABNORMAL MG/DL
RBC # UR STRIP: NEGATIVE /UL
SP GR UR: 1.03 (ref 1–1.03)
UROBILINOGEN UR QL: NORMAL

## 2020-06-23 PROCEDURE — 80050 GENERAL HEALTH PANEL: CPT | Performed by: NURSE PRACTITIONER

## 2020-06-23 PROCEDURE — 81025 URINE PREGNANCY TEST: CPT | Performed by: NURSE PRACTITIONER

## 2020-06-23 PROCEDURE — 82607 VITAMIN B-12: CPT | Performed by: NURSE PRACTITIONER

## 2020-06-23 PROCEDURE — 93000 ELECTROCARDIOGRAM COMPLETE: CPT | Performed by: NURSE PRACTITIONER

## 2020-06-23 PROCEDURE — 84703 CHORIONIC GONADOTROPIN ASSAY: CPT | Performed by: NURSE PRACTITIONER

## 2020-06-23 PROCEDURE — 99213 OFFICE O/P EST LOW 20 MIN: CPT | Performed by: NURSE PRACTITIONER

## 2020-06-23 PROCEDURE — 83735 ASSAY OF MAGNESIUM: CPT | Performed by: NURSE PRACTITIONER

## 2020-06-23 NOTE — PROGRESS NOTES
Subjective   Sandy Stephenson is a 25 y.o. female.     Chief Complaint: Fatigue and Dizziness    Dizziness   This is a new (syncope) problem. The current episode started yesterday. Episode frequency: once. The problem has been resolved. Associated symptoms include nausea. Pertinent negatives include no abdominal pain, chest pain, coughing or headaches. Nothing aggravates the symptoms. She has tried nothing for the symptoms.   Patient states that she was sitting with a friend yesterday and had drank 1 beer and was smoking a cigarette.  She states that she started having ringing in her ears and dizziness.  She felt like she was going to pass out.  She stood up and had to sit back down.  Her friend stated that her head went backwards.  Patient states that she feels like that she did lose consciousness.  She did not hit her head nor fall to the ground.  She has never experienced this before.  She states that she did have some palpitations during the incident.  She denies any chest pain, shortness of breath or headaches.  She denies any recent trauma.  She does have a history of GERD and anxiety.  She denies any anxiety from yesterday.    Family History   Problem Relation Age of Onset   • Hypertension Mother    • Thyroid disease Mother    • Cancer Mother         breast   • No Known Problems Father         car wreck    • Breast cancer Paternal Grandmother    • Hypertension Paternal Grandmother    • Arthritis Paternal Grandmother    • Cancer Paternal Grandmother         Breast       Social History     Socioeconomic History   • Marital status: Single     Spouse name: Not on file   • Number of children: Not on file   • Years of education: Not on file   • Highest education level: Not on file   Tobacco Use   • Smoking status: Current Every Day Smoker     Packs/day: 1.00     Types: Cigarettes   • Smokeless tobacco: Never Used   Substance and Sexual Activity   • Alcohol use: Yes     Frequency: Monthly or less   • Drug use: No   •  Sexual activity: Never     Birth control/protection: None       Past Medical History:   Diagnosis Date   • Headache    • Hypertension    • Obesity        Review of Systems   Constitutional: Negative.    HENT: Negative.    Respiratory: Negative.  Negative for cough.    Cardiovascular: Negative.  Negative for chest pain.   Gastrointestinal: Positive for nausea. Negative for abdominal pain.   Musculoskeletal: Negative.    Skin: Negative.    Neurological: Positive for dizziness. Negative for headaches.   Psychiatric/Behavioral: Negative.        Objective   Physical Exam   Constitutional: She is oriented to person, place, and time. She appears well-developed and well-nourished.   Grandmother with patient today; patient not driving today   HENT:   Right Ear: External ear normal.   Left Ear: External ear normal.   Mouth/Throat: Oropharynx is clear and moist.   Eyes: Pupils are equal, round, and reactive to light. Conjunctivae and EOM are normal.   Neck: Normal range of motion. Neck supple.   Cardiovascular: Normal rate, regular rhythm and normal heart sounds.   Pulmonary/Chest: Effort normal and breath sounds normal.   Musculoskeletal: Normal range of motion.   Lymphadenopathy:     She has no cervical adenopathy.   Neurological: She is alert and oriented to person, place, and time. No cranial nerve deficit.   Skin: Skin is warm and dry.   Psychiatric: She has a normal mood and affect. Her behavior is normal. Judgment and thought content normal.   Nursing note and vitals reviewed.        ECG 12 Lead  Date/Time: 6/23/2020 10:45 AM  Performed by: Ute Taylor APRN  Authorized by: Ute Taylor APRN   Comparison: not compared with previous ECG   Rhythm: sinus rhythm  Rate: normal  Conduction: conduction normal  ST Segments: ST segments normal  T Waves: T waves normal  QRS axis: normal  Other findings: T wave abnormality    Clinical impression: non-specific ECG            Vitals: Temperature 97.5 °F (36.4  "°C), height 175.3 cm (69\"), weight 101 kg (223 lb), SpO2 98 %, not currently breastfeeding.    Body mass index is 32.93 kg/m².     Allergies: No Known Allergies     During this visit the following were done:  Labs Reviewed []    Labs Ordered []    Radiology Reports Reviewed []    Radiology Ordered []    PCP Records Reviewed []    Referring Provider Records Reviewed []    ER Records Reviewed []    Hospital Records Reviewed []    History Obtained From Family []    Radiology Images Reviewed []    Other Reviewed []    Records Requested []      Assessment/Plan   Sandy was seen today for fatigue and dizziness.    Diagnoses and all orders for this visit:    Syncope, unspecified syncope type  -     CBC & Differential; Future  -     Comprehensive Metabolic Panel; Future  -     TSH; Future  -     Magnesium; Future  -     Vitamin B12; Future  -     ECG 12 Lead  -     POCT urinalysis dipstick, automated  -     POCT pregnancy, urine  -     hCG, Serum, Qualitative; Future    Palpitations  -     ECG 12 Lead        If labs normal, may consider holter monitor and/or echo  Orthostatic blood pressure and heart rate within normal limits today.       "

## 2020-06-24 DIAGNOSIS — R00.2 PALPITATIONS: ICD-10-CM

## 2020-06-24 DIAGNOSIS — R55 SYNCOPE, UNSPECIFIED SYNCOPE TYPE: Primary | ICD-10-CM

## 2020-06-24 LAB
ALBUMIN SERPL-MCNC: 4.6 G/DL (ref 3.5–5.2)
ALBUMIN/GLOB SERPL: 1.7 G/DL
ALP SERPL-CCNC: 85 U/L (ref 39–117)
ALT SERPL W P-5'-P-CCNC: 14 U/L (ref 1–33)
ANION GAP SERPL CALCULATED.3IONS-SCNC: 9.6 MMOL/L (ref 5–15)
AST SERPL-CCNC: 10 U/L (ref 1–32)
BASOPHILS # BLD AUTO: 0.07 10*3/MM3 (ref 0–0.2)
BASOPHILS NFR BLD AUTO: 0.5 % (ref 0–1.5)
BILIRUB SERPL-MCNC: 0.2 MG/DL (ref 0.2–1.2)
BUN BLD-MCNC: 10 MG/DL (ref 6–20)
BUN/CREAT SERPL: 12.2 (ref 7–25)
CALCIUM SPEC-SCNC: 9.5 MG/DL (ref 8.6–10.5)
CHLORIDE SERPL-SCNC: 107 MMOL/L (ref 98–107)
CO2 SERPL-SCNC: 22.4 MMOL/L (ref 22–29)
CREAT BLD-MCNC: 0.82 MG/DL (ref 0.57–1)
DEPRECATED RDW RBC AUTO: 41.6 FL (ref 37–54)
EOSINOPHIL # BLD AUTO: 0.11 10*3/MM3 (ref 0–0.4)
EOSINOPHIL NFR BLD AUTO: 0.7 % (ref 0.3–6.2)
ERYTHROCYTE [DISTWIDTH] IN BLOOD BY AUTOMATED COUNT: 13.4 % (ref 12.3–15.4)
GFR SERPL CREATININE-BSD FRML MDRD: 86 ML/MIN/1.73
GLOBULIN UR ELPH-MCNC: 2.7 GM/DL
GLUCOSE BLD-MCNC: 101 MG/DL (ref 65–99)
HCG SERPL QL: NEGATIVE
HCT VFR BLD AUTO: 41.8 % (ref 34–46.6)
HGB BLD-MCNC: 13.7 G/DL (ref 12–15.9)
IMM GRANULOCYTES # BLD AUTO: 0.07 10*3/MM3 (ref 0–0.05)
IMM GRANULOCYTES NFR BLD AUTO: 0.5 % (ref 0–0.5)
LYMPHOCYTES # BLD AUTO: 2.05 10*3/MM3 (ref 0.7–3.1)
LYMPHOCYTES NFR BLD AUTO: 13.9 % (ref 19.6–45.3)
MAGNESIUM SERPL-MCNC: 2.2 MG/DL (ref 1.6–2.6)
MCH RBC QN AUTO: 27.8 PG (ref 26.6–33)
MCHC RBC AUTO-ENTMCNC: 32.8 G/DL (ref 31.5–35.7)
MCV RBC AUTO: 84.8 FL (ref 79–97)
MONOCYTES # BLD AUTO: 0.9 10*3/MM3 (ref 0.1–0.9)
MONOCYTES NFR BLD AUTO: 6.1 % (ref 5–12)
NEUTROPHILS # BLD AUTO: 11.53 10*3/MM3 (ref 1.7–7)
NEUTROPHILS NFR BLD AUTO: 78.3 % (ref 42.7–76)
NRBC BLD AUTO-RTO: 0 /100 WBC (ref 0–0.2)
PLATELET # BLD AUTO: 395 10*3/MM3 (ref 140–450)
PMV BLD AUTO: 11.3 FL (ref 6–12)
POTASSIUM BLD-SCNC: 3.8 MMOL/L (ref 3.5–5.2)
PROT SERPL-MCNC: 7.3 G/DL (ref 6–8.5)
RBC # BLD AUTO: 4.93 10*6/MM3 (ref 3.77–5.28)
SODIUM BLD-SCNC: 139 MMOL/L (ref 136–145)
TSH SERPL DL<=0.05 MIU/L-ACNC: 1.28 UIU/ML (ref 0.27–4.2)
VIT B12 BLD-MCNC: 430 PG/ML (ref 211–946)
WBC NRBC COR # BLD: 14.73 10*3/MM3 (ref 3.4–10.8)

## 2020-06-30 ENCOUNTER — HOSPITAL ENCOUNTER (OUTPATIENT)
Dept: CARDIOLOGY | Facility: HOSPITAL | Age: 25
Discharge: HOME | End: 2020-06-30
Admitting: NURSE PRACTITIONER

## 2020-06-30 ENCOUNTER — HOSPITAL ENCOUNTER (OUTPATIENT)
Dept: RESPIRATORY THERAPY | Facility: HOSPITAL | Age: 25
Discharge: HOME | End: 2020-06-30

## 2020-06-30 LAB
BH CV ECHO MEAS - % IVS THICK: 18.3 %
BH CV ECHO MEAS - % LVPW THICK: 25.8 %
BH CV ECHO MEAS - ACS: 2.4 CM
BH CV ECHO MEAS - AO MAX PG: 6.4 MMHG
BH CV ECHO MEAS - AO MEAN PG: 3 MMHG
BH CV ECHO MEAS - AO ROOT AREA (BSA CORRECTED): 1.5
BH CV ECHO MEAS - AO ROOT AREA: 8 CM^2
BH CV ECHO MEAS - AO ROOT DIAM: 3.2 CM
BH CV ECHO MEAS - AO V2 MAX: 126 CM/SEC
BH CV ECHO MEAS - AO V2 MEAN: 84 CM/SEC
BH CV ECHO MEAS - AO V2 VTI: 21 CM
BH CV ECHO MEAS - BSA(HAYCOCK): 2.3 M^2
BH CV ECHO MEAS - BSA: 2.2 M^2
BH CV ECHO MEAS - BZI_BMI: 32.9 KILOGRAMS/M^2
BH CV ECHO MEAS - BZI_METRIC_HEIGHT: 175.3 CM
BH CV ECHO MEAS - BZI_METRIC_WEIGHT: 101.2 KG
BH CV ECHO MEAS - EDV(CUBED): 92.3 ML
BH CV ECHO MEAS - EDV(MOD-SP4): 65.5 ML
BH CV ECHO MEAS - EDV(TEICH): 93.4 ML
BH CV ECHO MEAS - EF(CUBED): 68.1 %
BH CV ECHO MEAS - EF(MOD-SP4): 66.9 %
BH CV ECHO MEAS - EF(TEICH): 59.7 %
BH CV ECHO MEAS - ESV(CUBED): 29.5 ML
BH CV ECHO MEAS - ESV(MOD-SP4): 21.7 ML
BH CV ECHO MEAS - ESV(TEICH): 37.6 ML
BH CV ECHO MEAS - FS: 31.6 %
BH CV ECHO MEAS - IVS/LVPW: 1
BH CV ECHO MEAS - IVSD: 1.1 CM
BH CV ECHO MEAS - IVSS: 1.3 CM
BH CV ECHO MEAS - LA DIMENSION: 2.5 CM
BH CV ECHO MEAS - LA/AO: 0.78
BH CV ECHO MEAS - LV DIASTOLIC VOL/BSA (35-75): 30.3 ML/M^2
BH CV ECHO MEAS - LV MASS(C)D: 168.4 GRAMS
BH CV ECHO MEAS - LV MASS(C)DI: 77.9 GRAMS/M^2
BH CV ECHO MEAS - LV MASS(C)S: 129.3 GRAMS
BH CV ECHO MEAS - LV MASS(C)SI: 59.8 GRAMS/M^2
BH CV ECHO MEAS - LV SYSTOLIC VOL/BSA (12-30): 10 ML/M^2
BH CV ECHO MEAS - LVIDD: 4.5 CM
BH CV ECHO MEAS - LVIDS: 3.1 CM
BH CV ECHO MEAS - LVLD AP4: 7.5 CM
BH CV ECHO MEAS - LVLS AP4: 5.7 CM
BH CV ECHO MEAS - LVOT AREA (M): 3.8 CM^2
BH CV ECHO MEAS - LVOT AREA: 3.8 CM^2
BH CV ECHO MEAS - LVOT DIAM: 2.2 CM
BH CV ECHO MEAS - LVPWD: 1.1 CM
BH CV ECHO MEAS - LVPWS: 1.3 CM
BH CV ECHO MEAS - MV A MAX VEL: 47.6 CM/SEC
BH CV ECHO MEAS - MV E MAX VEL: 74.1 CM/SEC
BH CV ECHO MEAS - MV E/A: 1.6
BH CV ECHO MEAS - PA ACC TIME: 0.1 SEC
BH CV ECHO MEAS - PA PR(ACCEL): 33.1 MMHG
BH CV ECHO MEAS - SI(AO): 78.1 ML/M^2
BH CV ECHO MEAS - SI(CUBED): 29 ML/M^2
BH CV ECHO MEAS - SI(MOD-SP4): 20.2 ML/M^2
BH CV ECHO MEAS - SI(TEICH): 25.8 ML/M^2
BH CV ECHO MEAS - SV(AO): 168.9 ML
BH CV ECHO MEAS - SV(CUBED): 62.8 ML
BH CV ECHO MEAS - SV(MOD-SP4): 43.8 ML
BH CV ECHO MEAS - SV(TEICH): 55.8 ML

## 2020-06-30 PROCEDURE — 93306 TTE W/DOPPLER COMPLETE: CPT

## 2020-06-30 PROCEDURE — 93226 XTRNL ECG REC<48 HR SCAN A/R: CPT

## 2020-06-30 PROCEDURE — 93306 TTE W/DOPPLER COMPLETE: CPT | Performed by: SPECIALIST

## 2020-06-30 PROCEDURE — 93225 XTRNL ECG REC<48 HRS REC: CPT

## 2020-07-01 ENCOUNTER — TELEPHONE (OUTPATIENT)
Dept: FAMILY MEDICINE CLINIC | Facility: CLINIC | Age: 25
End: 2020-07-01

## 2020-07-01 NOTE — TELEPHONE ENCOUNTER
----- Message from LUIS MIGUEL Roberson sent at 7/1/2020 12:38 PM EDT -----  Her echocardiogram appears to be normal.  I am still awaiting her Holter monitor findings.  Please let patient know.    Left message for patient to return call.    Patient returned call and verbally understands.

## 2020-07-03 PROCEDURE — 93227 XTRNL ECG REC<48 HR R&I: CPT | Performed by: INTERNAL MEDICINE

## 2020-07-07 ENCOUNTER — TELEPHONE (OUTPATIENT)
Dept: FAMILY MEDICINE CLINIC | Facility: CLINIC | Age: 25
End: 2020-07-07

## 2020-07-07 DIAGNOSIS — F41.1 GAD (GENERALIZED ANXIETY DISORDER): ICD-10-CM

## 2020-07-07 NOTE — TELEPHONE ENCOUNTER
----- Message from LUIS MIGUEL Roberson sent at 7/7/2020  8:31 AM EDT -----  Her holter monitor appears to be normal.  Please let her know.      Spoke with patient and her grandmother and informed her of results. She voiced understanding.

## 2020-07-08 RX ORDER — ESCITALOPRAM OXALATE 10 MG/1
TABLET ORAL
Qty: 30 TABLET | Refills: 2 | Status: ON HOLD | OUTPATIENT
Start: 2020-07-08 | End: 2020-07-14

## 2020-07-14 ENCOUNTER — HOSPITAL ENCOUNTER (EMERGENCY)
Facility: HOSPITAL | Age: 25
Discharge: STILL ADMITTED OR ADMINISTRATIVE | End: 2020-07-14
Attending: EMERGENCY MEDICINE

## 2020-07-14 ENCOUNTER — HOSPITAL ENCOUNTER (INPATIENT)
Facility: HOSPITAL | Age: 25
LOS: 3 days | Discharge: HOME | End: 2020-07-17
Attending: PSYCHIATRY & NEUROLOGY | Admitting: PSYCHIATRY & NEUROLOGY

## 2020-07-14 ENCOUNTER — OFFICE VISIT (OUTPATIENT)
Dept: FAMILY MEDICINE CLINIC | Facility: CLINIC | Age: 25
End: 2020-07-14

## 2020-07-14 VITALS
RESPIRATION RATE: 16 BRPM | HEART RATE: 97 BPM | TEMPERATURE: 97.1 F | HEIGHT: 70 IN | DIASTOLIC BLOOD PRESSURE: 73 MMHG | OXYGEN SATURATION: 96 % | SYSTOLIC BLOOD PRESSURE: 149 MMHG | WEIGHT: 220 LBS | BODY MASS INDEX: 31.5 KG/M2

## 2020-07-14 VITALS
TEMPERATURE: 97.1 F | HEIGHT: 69 IN | DIASTOLIC BLOOD PRESSURE: 78 MMHG | HEART RATE: 96 BPM | OXYGEN SATURATION: 97 % | SYSTOLIC BLOOD PRESSURE: 118 MMHG | BODY MASS INDEX: 33.77 KG/M2 | WEIGHT: 228 LBS

## 2020-07-14 DIAGNOSIS — F41.1 GAD (GENERALIZED ANXIETY DISORDER): ICD-10-CM

## 2020-07-14 DIAGNOSIS — R45.851 DEPRESSION WITH SUICIDAL IDEATION: Primary | ICD-10-CM

## 2020-07-14 DIAGNOSIS — F32.A DEPRESSION WITH SUICIDAL IDEATION: Primary | ICD-10-CM

## 2020-07-14 DIAGNOSIS — K21.9 GASTROESOPHAGEAL REFLUX DISEASE, ESOPHAGITIS PRESENCE NOT SPECIFIED: ICD-10-CM

## 2020-07-14 DIAGNOSIS — I10 ESSENTIAL HYPERTENSION: ICD-10-CM

## 2020-07-14 PROBLEM — F32.9 MDD (MAJOR DEPRESSIVE DISORDER), SINGLE EPISODE: Status: ACTIVE | Noted: 2020-07-14

## 2020-07-14 LAB
6-ACETYL MORPHINE: NEGATIVE
ALBUMIN SERPL-MCNC: 3.79 G/DL (ref 3.5–5.2)
ALBUMIN/GLOB SERPL: 1.3 G/DL
ALP SERPL-CCNC: 77 U/L (ref 39–117)
ALT SERPL W P-5'-P-CCNC: 15 U/L (ref 1–33)
AMPHET+METHAMPHET UR QL: NEGATIVE
ANION GAP SERPL CALCULATED.3IONS-SCNC: 12.3 MMOL/L (ref 5–15)
AST SERPL-CCNC: 12 U/L (ref 1–32)
B-HCG UR QL: NEGATIVE
BARBITURATES UR QL SCN: NEGATIVE
BASOPHILS # BLD AUTO: 0.06 10*3/MM3 (ref 0–0.2)
BASOPHILS NFR BLD AUTO: 0.5 % (ref 0–1.5)
BENZODIAZ UR QL SCN: NEGATIVE
BILIRUB SERPL-MCNC: 0.2 MG/DL (ref 0–1.2)
BILIRUB UR QL STRIP: NEGATIVE
BUN SERPL-MCNC: 8 MG/DL (ref 6–20)
BUN/CREAT SERPL: 12.3 (ref 7–25)
BUPRENORPHINE SERPL-MCNC: NEGATIVE NG/ML
CALCIUM SPEC-SCNC: 9.1 MG/DL (ref 8.6–10.5)
CANNABINOIDS SERPL QL: POSITIVE
CHLORIDE SERPL-SCNC: 106 MMOL/L (ref 98–107)
CLARITY UR: ABNORMAL
CO2 SERPL-SCNC: 21.7 MMOL/L (ref 22–29)
COCAINE UR QL: NEGATIVE
COLOR UR: YELLOW
CREAT SERPL-MCNC: 0.65 MG/DL (ref 0.57–1)
DEPRECATED RDW RBC AUTO: 45.5 FL (ref 37–54)
EOSINOPHIL # BLD AUTO: 0.2 10*3/MM3 (ref 0–0.4)
EOSINOPHIL NFR BLD AUTO: 1.5 % (ref 0.3–6.2)
ERYTHROCYTE [DISTWIDTH] IN BLOOD BY AUTOMATED COUNT: 14.2 % (ref 12.3–15.4)
ETHANOL BLD-MCNC: <10 MG/DL (ref 0–10)
ETHANOL UR QL: <0.01 %
GFR SERPL CREATININE-BSD FRML MDRD: 113 ML/MIN/1.73
GLOBULIN UR ELPH-MCNC: 2.8 GM/DL
GLUCOSE SERPL-MCNC: 135 MG/DL (ref 65–99)
GLUCOSE UR STRIP-MCNC: NEGATIVE MG/DL
HCT VFR BLD AUTO: 41.8 % (ref 34–46.6)
HGB BLD-MCNC: 13.3 G/DL (ref 12–15.9)
HGB UR QL STRIP.AUTO: NEGATIVE
HOLD SPECIMEN: NORMAL
HOLD SPECIMEN: NORMAL
IMM GRANULOCYTES # BLD AUTO: 0.04 10*3/MM3 (ref 0–0.05)
IMM GRANULOCYTES NFR BLD AUTO: 0.3 % (ref 0–0.5)
KETONES UR QL STRIP: NEGATIVE
LEUKOCYTE ESTERASE UR QL STRIP.AUTO: NEGATIVE
LYMPHOCYTES # BLD AUTO: 2.3 10*3/MM3 (ref 0.7–3.1)
LYMPHOCYTES NFR BLD AUTO: 17.6 % (ref 19.6–45.3)
MAGNESIUM SERPL-MCNC: 2 MG/DL (ref 1.6–2.6)
MCH RBC QN AUTO: 27.9 PG (ref 26.6–33)
MCHC RBC AUTO-ENTMCNC: 31.8 G/DL (ref 31.5–35.7)
MCV RBC AUTO: 87.8 FL (ref 79–97)
METHADONE UR QL SCN: NEGATIVE
MONOCYTES # BLD AUTO: 0.69 10*3/MM3 (ref 0.1–0.9)
MONOCYTES NFR BLD AUTO: 5.3 % (ref 5–12)
NEUTROPHILS NFR BLD AUTO: 74.8 % (ref 42.7–76)
NEUTROPHILS NFR BLD AUTO: 9.77 10*3/MM3 (ref 1.7–7)
NITRITE UR QL STRIP: NEGATIVE
NRBC BLD AUTO-RTO: 0 /100 WBC (ref 0–0.2)
OPIATES UR QL: NEGATIVE
OXYCODONE UR QL SCN: NEGATIVE
PCP UR QL SCN: NEGATIVE
PH UR STRIP.AUTO: 8.5 [PH] (ref 5–8)
PLATELET # BLD AUTO: 315 10*3/MM3 (ref 140–450)
PMV BLD AUTO: 10.2 FL (ref 6–12)
POTASSIUM SERPL-SCNC: 3.5 MMOL/L (ref 3.5–5.2)
PROT SERPL-MCNC: 6.6 G/DL (ref 6–8.5)
PROT UR QL STRIP: NEGATIVE
RBC # BLD AUTO: 4.76 10*6/MM3 (ref 3.77–5.28)
SODIUM SERPL-SCNC: 140 MMOL/L (ref 136–145)
SP GR UR STRIP: 1.02 (ref 1–1.03)
UROBILINOGEN UR QL STRIP: ABNORMAL
WBC # BLD AUTO: 13.06 10*3/MM3 (ref 3.4–10.8)
WHOLE BLOOD HOLD SPECIMEN: NORMAL
WHOLE BLOOD HOLD SPECIMEN: NORMAL

## 2020-07-14 PROCEDURE — 81003 URINALYSIS AUTO W/O SCOPE: CPT | Performed by: PHYSICIAN ASSISTANT

## 2020-07-14 PROCEDURE — 80307 DRUG TEST PRSMV CHEM ANLYZR: CPT | Performed by: PHYSICIAN ASSISTANT

## 2020-07-14 PROCEDURE — 83735 ASSAY OF MAGNESIUM: CPT | Performed by: PHYSICIAN ASSISTANT

## 2020-07-14 PROCEDURE — 93010 ELECTROCARDIOGRAM REPORT: CPT | Performed by: INTERNAL MEDICINE

## 2020-07-14 PROCEDURE — 93005 ELECTROCARDIOGRAM TRACING: CPT | Performed by: PSYCHIATRY & NEUROLOGY

## 2020-07-14 PROCEDURE — 85025 COMPLETE CBC W/AUTO DIFF WBC: CPT | Performed by: PHYSICIAN ASSISTANT

## 2020-07-14 PROCEDURE — 80053 COMPREHEN METABOLIC PANEL: CPT | Performed by: PHYSICIAN ASSISTANT

## 2020-07-14 PROCEDURE — 81025 URINE PREGNANCY TEST: CPT | Performed by: PHYSICIAN ASSISTANT

## 2020-07-14 PROCEDURE — 99214 OFFICE O/P EST MOD 30 MIN: CPT | Performed by: NURSE PRACTITIONER

## 2020-07-14 RX ORDER — LISINOPRIL 20 MG/1
20 TABLET ORAL DAILY
Qty: 30 TABLET | Refills: 5 | Status: ON HOLD | OUTPATIENT
Start: 2020-07-14 | End: 2020-07-14

## 2020-07-14 RX ORDER — LISINOPRIL 10 MG/1
10 TABLET ORAL DAILY
COMMUNITY
End: 2020-11-03

## 2020-07-14 RX ORDER — ECHINACEA PURPUREA EXTRACT 125 MG
2 TABLET ORAL AS NEEDED
Status: DISCONTINUED | OUTPATIENT
Start: 2020-07-14 | End: 2020-07-17 | Stop reason: HOSPADM

## 2020-07-14 RX ORDER — HYDROXYZINE 50 MG/1
50 TABLET, FILM COATED ORAL EVERY 6 HOURS PRN
Status: DISCONTINUED | OUTPATIENT
Start: 2020-07-14 | End: 2020-07-17 | Stop reason: HOSPADM

## 2020-07-14 RX ORDER — ONDANSETRON 4 MG/1
4 TABLET, FILM COATED ORAL EVERY 6 HOURS PRN
Status: DISCONTINUED | OUTPATIENT
Start: 2020-07-14 | End: 2020-07-17 | Stop reason: HOSPADM

## 2020-07-14 RX ORDER — ACETAMINOPHEN 325 MG/1
650 TABLET ORAL EVERY 6 HOURS PRN
Status: DISCONTINUED | OUTPATIENT
Start: 2020-07-14 | End: 2020-07-17 | Stop reason: HOSPADM

## 2020-07-14 RX ORDER — BENZTROPINE MESYLATE 1 MG/ML
1 INJECTION INTRAMUSCULAR; INTRAVENOUS ONCE AS NEEDED
Status: DISCONTINUED | OUTPATIENT
Start: 2020-07-14 | End: 2020-07-17 | Stop reason: HOSPADM

## 2020-07-14 RX ORDER — IBUPROFEN 400 MG/1
400 TABLET ORAL EVERY 6 HOURS PRN
Status: DISCONTINUED | OUTPATIENT
Start: 2020-07-14 | End: 2020-07-17 | Stop reason: HOSPADM

## 2020-07-14 RX ORDER — ESCITALOPRAM OXALATE 10 MG/1
10 TABLET ORAL DAILY
Qty: 30 TABLET | Refills: 2 | Status: CANCELLED | OUTPATIENT
Start: 2020-07-14

## 2020-07-14 RX ORDER — LOPERAMIDE HYDROCHLORIDE 2 MG/1
2 CAPSULE ORAL
Status: DISCONTINUED | OUTPATIENT
Start: 2020-07-14 | End: 2020-07-17 | Stop reason: HOSPADM

## 2020-07-14 RX ORDER — OMEPRAZOLE 40 MG/1
40 CAPSULE, DELAYED RELEASE ORAL DAILY
Qty: 30 CAPSULE | Refills: 2 | Status: ON HOLD | OUTPATIENT
Start: 2020-07-14 | End: 2020-07-14

## 2020-07-14 RX ORDER — NICOTINE 21 MG/24HR
1 PATCH, TRANSDERMAL 24 HOURS TRANSDERMAL
Status: DISCONTINUED | OUTPATIENT
Start: 2020-07-14 | End: 2020-07-17 | Stop reason: HOSPADM

## 2020-07-14 RX ORDER — BENZTROPINE MESYLATE 1 MG/1
2 TABLET ORAL ONCE AS NEEDED
Status: DISCONTINUED | OUTPATIENT
Start: 2020-07-14 | End: 2020-07-17 | Stop reason: HOSPADM

## 2020-07-14 RX ORDER — TRAZODONE HYDROCHLORIDE 50 MG/1
50 TABLET ORAL NIGHTLY PRN
Status: DISCONTINUED | OUTPATIENT
Start: 2020-07-14 | End: 2020-07-16

## 2020-07-14 RX ORDER — BENZONATATE 100 MG/1
100 CAPSULE ORAL 3 TIMES DAILY PRN
Status: DISCONTINUED | OUTPATIENT
Start: 2020-07-14 | End: 2020-07-17 | Stop reason: HOSPADM

## 2020-07-14 RX ORDER — FAMOTIDINE 20 MG/1
20 TABLET, FILM COATED ORAL 2 TIMES DAILY PRN
Status: DISCONTINUED | OUTPATIENT
Start: 2020-07-14 | End: 2020-07-17 | Stop reason: HOSPADM

## 2020-07-14 RX ORDER — ALUMINA, MAGNESIA, AND SIMETHICONE 2400; 2400; 240 MG/30ML; MG/30ML; MG/30ML
15 SUSPENSION ORAL EVERY 6 HOURS PRN
Status: DISCONTINUED | OUTPATIENT
Start: 2020-07-14 | End: 2020-07-17 | Stop reason: HOSPADM

## 2020-07-14 RX ADMIN — NICOTINE 1 PATCH: 21 PATCH TRANSDERMAL at 19:27

## 2020-07-14 NOTE — NURSING NOTE
Assessment was completed and pt stated she was at her PCP's office and she had told her she had been feeling depressed and had thoughts of killing herself. Pt stated that she really didn't know what was going on with her but she just felt empty in life and did not know her purpose in life or why she was here.

## 2020-07-14 NOTE — ED PROVIDER NOTES
Subjective   25-year-old female who presents to the ED today for a mental health evaluation.  She states she has been dealing with depression for over a year.  She saw her primary care provider today for refills on her Lexapro and the decision was made that she should come here for an evaluation.  She states she started to have some suicidal ideations last night.  She states she made a plan to either overdose or strangle herself.  She denies any homicidal ideations.  She states she has been eating but her sleep has been poor.  She also reports that she has been drinking beer over the last week to try to help calm herself.  She has also been intermittently using marijuana.  She denies any hallucinations.      History provided by:  Patient  Mental Health Problem   Presenting symptoms: depression and suicidal thoughts    Degree of incapacity (severity):  Severe  Onset quality:  Gradual  Duration: 1 year.  Timing:  Constant  Progression:  Worsening  Chronicity:  Chronic  Context: not noncompliant    Relieved by:  Nothing  Worsened by:  Nothing  Associated symptoms: insomnia    Associated symptoms: no appetite change    Risk factors: hx of mental illness        Review of Systems   Constitutional: Negative for appetite change.   HENT: Negative.    Eyes: Negative.    Respiratory: Negative.    Cardiovascular: Negative.    Gastrointestinal: Negative.    Genitourinary: Negative.    Musculoskeletal: Negative.    Skin: Negative.    Neurological: Negative.    Psychiatric/Behavioral: Positive for dysphoric mood, sleep disturbance and suicidal ideas. The patient has insomnia.    All other systems reviewed and are negative.      Past Medical History:   Diagnosis Date   • Anxiety    • Depression    • GERD (gastroesophageal reflux disease)    • Headache    • Hypertension    • Obesity        No Known Allergies    Past Surgical History:   Procedure Laterality Date   • GALLBLADDER SURGERY  06/02/2020   • WISDOM TOOTH EXTRACTION  2014        Family History   Problem Relation Age of Onset   • Hypertension Mother    • Thyroid disease Mother    • Cancer Mother         breast   • No Known Problems Father         car wreck    • Breast cancer Paternal Grandmother    • Hypertension Paternal Grandmother    • Arthritis Paternal Grandmother    • Cancer Paternal Grandmother         Breast       Social History     Socioeconomic History   • Marital status: Single     Spouse name: Not on file   • Number of children: Not on file   • Years of education: Not on file   • Highest education level: Not on file   Tobacco Use   • Smoking status: Current Every Day Smoker     Packs/day: 1.00     Years: 6.00     Pack years: 6.00     Types: Cigarettes   • Smokeless tobacco: Never Used   Substance and Sexual Activity   • Alcohol use: Yes     Frequency: Monthly or less     Comment: pt stated she has only been drinking for about a week and its 2-3 whitclaws daily.   • Drug use: No     Comment: Pt stated she has been smoking THC on and off for 2-3yrs. Last smoked yesterday.   • Sexual activity: Yes     Partners: Male     Birth control/protection: None           Objective   Physical Exam   Constitutional: She is oriented to person, place, and time. She appears well-developed and well-nourished. No distress.   HENT:   Head: Normocephalic and atraumatic.   Right Ear: External ear normal.   Left Ear: External ear normal.   Eyes: Pupils are equal, round, and reactive to light. Conjunctivae and EOM are normal.   Neck: Normal range of motion. Neck supple.   Cardiovascular: Normal rate, regular rhythm, normal heart sounds and intact distal pulses.   Pulmonary/Chest: Effort normal and breath sounds normal.   Abdominal: Soft. Bowel sounds are normal.   Musculoskeletal: Normal range of motion.   Neurological: She is alert and oriented to person, place, and time.   Skin: Skin is warm and dry. Capillary refill takes less than 2 seconds.   Psychiatric: Her speech is normal and behavior is  normal. Judgment normal. Cognition and memory are normal. She exhibits a depressed mood. She expresses suicidal ideation. She expresses no homicidal ideation. She expresses suicidal plans.   Nursing note and vitals reviewed.      Procedures           ED Course  ED Course as of Jul 14 1801 Tue Jul 14, 2020   1730 Medically clear for psych    [AH]      ED Course User Index  [] Marisol Phan PA                                           MDM  Number of Diagnoses or Management Options  Depression with suicidal ideation:      Amount and/or Complexity of Data Reviewed  Clinical lab tests: reviewed  Discuss the patient with other providers: yes    Patient Progress  Patient progress: stable      Final diagnoses:   Depression with suicidal ideation            Marisol Phan PA  07/14/20 1801

## 2020-07-14 NOTE — PROGRESS NOTES
Department of Anesthesiology  Preprocedure Note       Name:  Ovi Dias   Age:  70 y.o.  :  1940                                          MRN:  [id number]         Date:  2020      Surgeon: * No surgeons listed *    Procedure: I&D    Medications prior to admission:   Prior to Admission medications    Medication Sig Start Date End Date Taking? Authorizing Provider   prednisoLONE acetate (PRED FORTE) 1 % ophthalmic suspension Place 1 drop into the left eye 4 times daily 20  Yes Historical Provider, MD   ketorolac (ACULAR) 0.5 % ophthalmic solution 1 drop 4 times daily   Yes Historical Provider, MD   omeprazole (PRILOSEC) 20 MG delayed release capsule Take 40 mg by mouth Daily    Yes Historical Provider, MD   aspirin 81 MG tablet Take 81 mg by mouth daily   Yes Historical Provider, MD   hyoscyamine (ANASPAZ;LEVSIN) 125 MCG tablet Take 125 mcg by mouth 3 times daily   Yes Historical Provider, MD   ondansetron (ZOFRAN) 4 MG tablet Take 4 mg by mouth every 12 hours as needed for Nausea or Vomiting   Yes Historical Provider, MD   oxyCODONE-acetaminophen (PERCOCET)  MG per tablet Take 1 tablet by mouth every 6 hours as needed for Pain  Instructed to take with sip water am of procedure if needs . Yes Historical Provider, MD   fenofibrate (TRICOR) 145 MG tablet Take 145 mg by mouth daily   Yes Historical Provider, MD   Ferrous Sulfate (IRON) 28 MG TABS Take by mouth daily   Yes Historical Provider, MD   pravastatin (PRAVACHOL) 40 MG tablet Take 40 mg by mouth every evening   Yes Historical Provider, MD   vitamin B-12 (CYANOCOBALAMIN) 500 MCG tablet Place 500 mcg under the tongue daily holdInstructed to hold 5 days pre-op / LD 3/16/2017   Yes Historical Provider, MD   escitalopram (LEXAPRO) 20 MG tablet Take 20 mg by mouth nightly.    Yes Historical Provider, MD   sodium bicarbonate 650 MG tablet Take 1 tablet by mouth 2 times daily (with meals) 18   Kayla Rowan MD   diltiazem Subjective   Sandy Stephenson is a 25 y.o. female.     Chief Complaint: Follow-up and Anxiety    Hypertension   This is a chronic problem. The current episode started more than 1 year ago. The problem is controlled. Associated symptoms include anxiety. Pertinent negatives include no headaches, palpitations or shortness of breath. There are no associated agents to hypertension. Current antihypertension treatment includes ACE inhibitors. The current treatment provides significant improvement.   Heartburn   She complains of heartburn. This is a chronic problem. The problem occurs frequently. The problem has been waxing and waning. The heartburn does not wake her from sleep. The heartburn does not limit her activity. The heartburn doesn't change with position. The symptoms are aggravated by certain foods. Risk factors include obesity and smoking/tobacco exposure. She has tried a PPI for the symptoms. The treatment provided significant relief.   Depression   Visit Type: follow-up  Patient presents with the following symptoms: decreased concentration, depressed mood, feelings of hopelessness, feelings of worthlessness, irritability, suicidal ideas and suicidal planning.  Patient is not experiencing: palpitations and shortness of breath.  Frequency of symptoms: most days   Severity: causing significant distress   Sleep quality: fair  Compliance with medications:  %        Patient states today that her depression has been worsening over the past several months.  She had an episode approximately 4 to 5 weeks ago where she had contemplated suicide.  She states that she had a handful of pain medication that she had planned to swallow and her family stopped her from doing so.  She states that she just feels empty on the inside and feels this way as soon as she awakes in the morning.  She has been taking the Lexapro that was prescribed for her but it has not helped with her symptoms.  She is crying in the exam room today.  I  have had a long discussion with her regarding her symptoms and the need for voluntary admission to the treatment center.  She states that she has not had any further suicidal thoughts or plans since the one incident.  She states that she realizes that she has family that she does not feel would be fair to them if she committed suicide.  After a long discussion today, she is willing to go to the emergency room and be evaluated by psychiatric intake.  She really does want to have help today.  She states that her grandmother is driving her today and she will have her grandmother take her to the emergency room.    Family History   Problem Relation Age of Onset   • Hypertension Mother    • Thyroid disease Mother    • Cancer Mother         breast   • No Known Problems Father         car wreck    • Breast cancer Paternal Grandmother    • Hypertension Paternal Grandmother    • Arthritis Paternal Grandmother    • Cancer Paternal Grandmother         Breast       Social History     Socioeconomic History   • Marital status: Single     Spouse name: Not on file   • Number of children: Not on file   • Years of education: Not on file   • Highest education level: Not on file   Tobacco Use   • Smoking status: Current Every Day Smoker     Packs/day: 1.00     Years: 6.00     Pack years: 6.00     Types: Cigarettes   • Smokeless tobacco: Never Used   Substance and Sexual Activity   • Alcohol use: Yes     Frequency: Monthly or less     Comment: pt stated she has only been drinking for about a week and its 2-3 whitclaws daily.   • Drug use: No     Comment: Pt stated she has been smoking THC on and off for 2-3yrs. Last smoked yesterday.   • Sexual activity: Yes     Partners: Male     Birth control/protection: None       Past Medical History:   Diagnosis Date   • Anxiety    • Depression    • GERD (gastroesophageal reflux disease)    • Headache    • Hypertension    • Obesity        Review of Systems   Constitutional: Positive for  "irritability.   HENT: Negative.    Respiratory: Negative.  Negative for shortness of breath.    Cardiovascular: Negative.  Negative for palpitations.   Gastrointestinal: Positive for heartburn.   Musculoskeletal: Negative.    Skin: Negative.    Neurological: Negative.  Negative for headaches.   Psychiatric/Behavioral: Positive for decreased concentration and suicidal ideas.       Objective   Physical Exam   Constitutional: She is oriented to person, place, and time. She appears well-developed and well-nourished.   Neck: Normal range of motion. Neck supple.   Cardiovascular: Normal rate, regular rhythm and normal heart sounds.   Pulmonary/Chest: Effort normal and breath sounds normal.   Neurological: She is alert and oriented to person, place, and time.   Skin: Skin is warm and dry.   Psychiatric: She has a normal mood and affect. Her behavior is normal. Judgment and thought content normal.   Nursing note and vitals reviewed.      Procedures    Vitals: Blood pressure 118/78, pulse 96, temperature 97.1 °F (36.2 °C), height 175.3 cm (69\"), weight 103 kg (228 lb), last menstrual period 07/10/2020, SpO2 97 %, not currently breastfeeding.    Body mass index is 33.67 kg/m².     Allergies: No Known Allergies     During this visit the following were done:  Labs Reviewed []    Labs Ordered []    Radiology Reports Reviewed []    Radiology Ordered []    PCP Records Reviewed []    Referring Provider Records Reviewed []    ER Records Reviewed []    Hospital Records Reviewed []    History Obtained From Family []    Radiology Images Reviewed []    Other Reviewed []    Records Requested []      Assessment/Plan   Sandy was seen today for follow-up and anxiety.    Diagnoses and all orders for this visit:    Essential hypertension  -     lisinopril (PRINIVIL,ZESTRIL) 20 MG tablet; Take 1 tablet by mouth Daily. for blood pressure.    FREDI (generalized anxiety disorder)  Moderate depressive disorder    Patient agreeable to going to the "  enoxaparin (LOVENOX) injection 30 mg  30 mg Subcutaneous Daily Parminder Zacarias,         piperacillin-tazobactam (ZOSYN) 3.375 g in dextrose 5 % 50 mL IVPB extended infusion (mini-bag)  3.375 g Intravenous Q8H Natalee Carter MD   Stopped at 01/26/20 0610    And    0.9 % sodium chloride infusion   Intravenous Continuous Natalee Carter MD 12.5 mL/hr at 01/26/20 0658      0.9 % sodium chloride infusion   Intravenous Continuous Eva Jesse,  mL/hr at 01/25/20 2143      diltiazem (CARDIZEM CD) extended release capsule 180 mg  180 mg Oral Daily Eva Jesse, DO        escitalopram (LEXAPRO) tablet 20 mg  20 mg Oral Nightly Eva Jesse, DO   20 mg at 01/25/20 2141    ondansetron (ZOFRAN) tablet 4 mg  4 mg Oral Q6H PRN Eva Jesse, DO   4 mg at 01/25/20 2218    pravastatin (PRAVACHOL) tablet 40 mg  40 mg Oral QPM Eva Jesse, DO   40 mg at 01/25/20 2221    oxyCODONE-acetaminophen (PERCOCET)  MG per tablet 1 tablet  1 tablet Oral Q6H PRN Eva Jesse, DO   1 tablet at 01/25/20 2218    propranolol (INDERAL) tablet 20 mg  20 mg Oral BID Eva Jesse, DO   20 mg at 01/25/20 2233    traZODone (DESYREL) tablet 50 mg  50 mg Oral Nightly Eva Jesse, DO   50 mg at 01/25/20 2346    primidone (MYSOLINE) tablet 250 mg  250 mg Oral Nightly Eva Jesse, DO   250 mg at 01/25/20 2221    hyoscyamine (ANASPAZ;LEVSIN) tablet 125 mcg  125 mcg Oral TID Eva Jesse, DO        omeprazole (PRILOSEC) delayed release capsule 40 mg  40 mg Oral Daily Eva Jesse, DO           Allergies: Allergies   Allergen Reactions    Fentanyl Itching     Patch only    Levofloxacin Other (See Comments)     Leg edema/pain    Tramadol Nausea Only     Nausea .  Sleeplisness, shakey    Vioxx [Rofecoxib]      Leg edema and pain       Problem List:    Patient Active Problem List   Diagnosis Code    Right cataract H26.9    Essential hypertension, benign I10    Hyperlipidemia with target LDL less than 100 emergency room for evaluation of psychiatric intake.  Grandmother aware that patient is willing to go and she is agreeable to taking patient.  Report called to the psychiatric intake nurse.    Gastroesophageal reflux disease, esophagitis presence not specified  -     omeprazole (priLOSEC) 40 MG capsule; Take 1 capsule by mouth Daily.                PLACEMENT  1-7-2002    ENDOSCOPY, COLON, DIAGNOSTIC      EPIGASTRIC HERNIA REPAIR  3/21/16    incisional with mesh implantation    ESOPHAGUS SURGERY  1-4-13    esophageal clamp (torn Esophagus)    FINGER TRIGGER RELEASE Right 2006    index finger    FOOT SURGERY Right     multiple    HERNIA REPAIR  12-31-13    abdominal x 5    ILEOSTOMY OR JEJUNOSTOMY  1-3-13    revision    JOINT REPLACEMENT Right 3/24/15    right total shoulder arthroplasty    KNEE ARTHROPLASTY Left 03/22/2017    oseteoarthritis     MOHS SURGERY      bilateal / leg    NASAL SINUS SURGERY      x2 /  cauterized    OTHER SURGICAL HISTORY  08/24/2016    diagnostic laparotomy with repair of intraabdominal hernia    OTHER SURGICAL HISTORY      removal plate / screws  / right great toe    ROTATOR CUFF REPAIR  2010    right     SKIN BIOPSY  2010    3 squamous cell carcinoma right leg, left leg       Social History:    Social History     Tobacco Use    Smoking status: Current Every Day Smoker     Packs/day: 1.00    Smokeless tobacco: Never Used   Substance Use Topics    Alcohol use: Yes     Comment: occasional                                Ready to quit: Not Answered  Counseling given: Not Answered      Vital Signs (Current):   Vitals:    01/25/20 1904 01/25/20 2230 01/26/20 0009 01/26/20 0342   BP: (!) 135/57 (!) 107/51 (!) 102/51    Pulse: 77 76 63    Resp: 22  18    Temp: 98.6 °F (37 °C)  98.8 °F (37.1 °C)    TempSrc:   Oral    SpO2: 93%  91%    Weight:    129 lb 1.6 oz (58.6 kg)   Height:                                                  BP Readings from Last 3 Encounters:   01/26/20 (!) 102/51   07/07/18 (!) 122/52   03/24/17 130/60       NPO Status:                                                                                 BMI:   Wt Readings from Last 3 Encounters:   01/26/20 129 lb 1.6 oz (58.6 kg)   07/07/18 138 lb 14.4 oz (63 kg)   03/23/17 126 lb (57.2 kg)     Body mass index is 25.21 kg/m².     CBC:   Lab Results   Component Value Date    WBC 14.3 01/26/2020    RBC 3.61 01/26/2020    HGB 11.3 01/26/2020    HCT 38.3 01/26/2020    .1 01/26/2020    RDW 13.7 01/26/2020     01/26/2020       CMP:   Lab Results   Component Value Date     01/26/2020    K 5.1 01/26/2020    K 5.2 01/25/2020     01/26/2020    CO2 20 01/26/2020    BUN 26 01/26/2020    CREATININE 1.4 01/26/2020    GFRAA 44 01/26/2020    LABGLOM 36 01/26/2020    GLUCOSE 113 01/26/2020    PROT 7.7 01/25/2020    CALCIUM 9.1 01/26/2020    BILITOT 0.4 01/25/2020    ALKPHOS 117 01/25/2020    AST 27 01/25/2020    ALT 14 01/25/2020       POC Tests: No results for input(s): POCGLU, POCNA, POCK, POCCL, POCBUN, POCHEMO, POCHCT in the last 72 hours. Coags:   Lab Results   Component Value Date    PROTIME 12.9 08/23/2016    INR 1.2 08/23/2016       HCG (If Applicable): No results found for: PREGTESTUR, PREGSERUM, HCG, HCGQUANT     ABGs: No results found for: PHART, PO2ART, ZDG8LWP, HUA1YIP, BEART, T0CGQUEU     Type & Screen (If Applicable):  No results found for: LABABO, [address]    Anesthesia Evaluation  Patient summary reviewed   history of anesthetic complications: PONV. Airway: Mallampati: I  TM distance: >3 FB   Neck ROM: full   Dental:      Comment: crowns    Pulmonary: breath sounds clear to auscultation  (+) COPD:  current smoker                           Cardiovascular:    (+) hypertension:, valvular problems/murmurs: MR, past MI:, CAD: obstructive, CABG/stent:, hyperlipidemia      ECG reviewed  Rhythm: regular  Rate: normal           Beta Blocker:  Dose within 24 Hrs         Neuro/Psych:   (+) depression/anxiety             GI/Hepatic/Renal:   (+) GERD:, renal disease: CRI,           Endo/Other:    (+) : arthritis: OA., .                 Abdominal:           Vascular:                                      Anesthesia Plan      general     ASA 3       Induction: intravenous.     MIPS: Postoperative opioids intended and Prophylactic antiemetics administered. Anesthetic plan and risks discussed with patient. Plan discussed with CRNA.                 Lee Lao MD   1/26/2020

## 2020-07-15 PROBLEM — F41.1 GENERALIZED ANXIETY DISORDER: Status: ACTIVE | Noted: 2020-07-15

## 2020-07-15 PROCEDURE — 99223 1ST HOSP IP/OBS HIGH 75: CPT | Performed by: PSYCHIATRY & NEUROLOGY

## 2020-07-15 RX ORDER — PANTOPRAZOLE SODIUM 40 MG/1
40 TABLET, DELAYED RELEASE ORAL DAILY
Status: DISCONTINUED | OUTPATIENT
Start: 2020-07-15 | End: 2020-07-17 | Stop reason: HOSPADM

## 2020-07-15 RX ORDER — LISINOPRIL 10 MG/1
10 TABLET ORAL DAILY
Status: DISCONTINUED | OUTPATIENT
Start: 2020-07-15 | End: 2020-07-17 | Stop reason: HOSPADM

## 2020-07-15 RX ORDER — OMEPRAZOLE 20 MG/1
20 CAPSULE, DELAYED RELEASE ORAL DAILY
Status: ON HOLD | COMMUNITY
End: 2020-07-15

## 2020-07-15 RX ORDER — OMEPRAZOLE 40 MG/1
40 CAPSULE, DELAYED RELEASE ORAL DAILY
COMMUNITY
End: 2020-08-06

## 2020-07-15 RX ADMIN — NICOTINE 1 PATCH: 21 PATCH TRANSDERMAL at 08:30

## 2020-07-15 RX ADMIN — HYDROXYZINE HYDROCHLORIDE 50 MG: 50 TABLET ORAL at 21:45

## 2020-07-15 RX ADMIN — PANTOPRAZOLE SODIUM 40 MG: 40 TABLET, DELAYED RELEASE ORAL at 10:03

## 2020-07-15 RX ADMIN — LISINOPRIL 10 MG: 10 TABLET ORAL at 10:01

## 2020-07-15 NOTE — PLAN OF CARE
Problem: Patient Care Overview  Goal: Plan of Care Review  Flowsheets  Taken 7/15/2020 0951 by Veronica Moody  Consent Given to Review Plan with: Grandmother  Plan of Care Reviewed With: patient  Taken 7/14/2020 1924 by Liss Lan, RN  Patient Agreement with Plan of Care: agrees     Problem: Patient Care Overview  Goal: Individualization and Mutuality  Flowsheets  Taken 7/15/2020 0942  Patient Personal Strengths: family/social support;expressive of needs;expressive of emotions;motivated for recovery;motivated for treatment;stable living environment;spiritual/Restorationism support;positive educational history  Patient Vulnerabilities: Ineffective coping skills; depression; anxiety  Taken 7/15/2020 0951  Patient Specific Goals (Include Timeframe): Patient will deny suicidal ideation at discharge.  Patient will identify 1-2 healthy coping skills prior to discharge.  How to Address Anxieties/Fears: Patient is agreeable to discuss any anxiety or fear with staff.  Patient Specific Interventions: Patient will be given daily psychiatric evaluation, 20 minutes in length; patient will be offered 1-4 individual sessions 20 to 30 minutes in length and daily group; will utilize brief therapy modality.  Will communicate with patient's family for collateral discharge planning.     Problem: Patient Care Overview  Goal: Discharge Needs Assessment  Flowsheets  Taken 7/15/2020 0951 by Veronica Moody  Outpatient/Agency/Support Group Needs: outpatient counseling;outpatient medication management  Discharge Coordination/Progress: Patient has [employer] insurance  Anticipated Discharge Disposition: home or self-care  Current Discharge Risk: psychiatric illness  Concerns to be Addressed: suicidal;mental health  Readmission Within the Last 30 Days: no previous admission in last 30 days  Patient's Choice of Community Agency(s): [facility]  Taken 7/14/2020 1924 by Liss Lan, RN  Transportation Anticipated: family or friend will  provide  Patient/Family Anticipated Services at Transition: outpatient care  Patient/Family Anticipates Transition to: home with family     Problem: Patient Care Overview  Goal: Interprofessional Rounds/Family Conf  Flowsheets (Taken 7/15/2020 0951)  Participants: family; nursing; psychiatrist  Summary: Therapist will communicate with patient's family, nursing staff, psychiatry for collateral discharge planning.  Note:   Patient signed consent to speak with her grandmother.     Problem: Patient Care Overview  Goal: Individualization and Mutuality  Flowsheets  Taken 7/15/2020 0942  Patient Personal Strengths: family/social support;expressive of needs;expressive of emotions;motivated for recovery;motivated for treatment;stable living environment;spiritual/Yazidi support;positive educational history  Patient Vulnerabilities: Ineffective coping skills; depression; anxiety  Taken 7/15/2020 0951  Patient Specific Goals (Include Timeframe): Patient will deny suicidal ideation at discharge.  Patient will identify 1-2 healthy coping skills prior to discharge.  How to Address Anxieties/Fears: Patient is agreeable to discuss any anxiety or fear with staff.  Patient Specific Interventions: Patient will be given daily psychiatric evaluation, 20 minutes in length; patient will be offered 1-4 individual sessions 20 to 30 minutes in length and daily group; will utilize brief therapy modality.  Will communicate with patient's family for collateral discharge planning.     Problem: Overarching Goals (Adult)  Goal: Optimized Coping Skills in Response to Life Stressors  Intervention: Promote Effective Coping Strategies  Flowsheets (Taken 7/15/2020 0951)  Supportive Measures: active listening utilized; counseling provided; decision-making supported; goal setting facilitated; positive reinforcement provided; self-care encouraged; verbalization of feelings encouraged; self-responsibility promoted; problem solving facilitated;  self-reflection promoted  Note:   Patient identifies that she enjoys listening to music, being outside, camping, hiking     Problem: Overarching Goals (Adult)  Goal: Develops/Participates in Therapeutic [place] to Support Successful Transition  Intervention: Foster Therapeutic [place]  Flowsheets (Taken 7/15/2020 0951)  Trust Relationship/Rapport: care explained; choices provided; emotional support provided; empathic listening provided; reassurance provided; questions encouraged; questions answered; thoughts/feelings acknowledged     Problem: Overarching Goals (Adult)  Goal: Develops/Participates in Therapeutic [place] to Support Successful Transition  Intervention: Mutually Develop Transition Plan  Flowsheets (Taken 7/15/2020 0951)  Transition Support: community resources reviewed; follow-up care discussed  Note:   [facility]    DATA:         Therapist met individually with patient this date to introduce role and to discuss hospitalization expectations. Patient agreeable.  Patient was cooperative during the interview but tearful and focused on discharging home as soon as possible.     Clinical Maneuvering/Intervention:     Therapist assisted patient in processing above session content; acknowledged and normalized patient’s thoughts, feelings, and concerns.  Discussed the therapist/patient relationship and explain the parameters and limitations of relative confidentiality.  Also discussed the importance of active participation, and honesty to the treatment process.  Encouraged the patient to discuss/vent their feelings, frustrations, and fears concerning their ongoing medical issues and validated their feelings.     Discussed the importance of finding enjoyable activities and coping skills that the patient can engage in a regular basis. Discussed healthy coping skills such as distraction, self love, grounding, thought challenges/reframing, etc.  Provided patient with list of healthy coping skills this  "date. Discussed the importance of medication compliance.  Praised the patient for seeking help and spent the majority of the session building rapport.       Allowed patient to freely discuss issues without interruption or judgment. Provided safe, confidential environment to facilitate the development of positive therapeutic relationship and encourage open, honest communication.      Therapist addressed discharge safety planning this date. Assisted patient in identifying risk factors which would indicate the need for higher level of care after discharge;  including thoughts to harm self or others and/or self-harming behavior. Encouraged patient to call 911, or present to the nearest emergency room should any of these events occur. Discussed crisis intervention services and means to access.  Encouraged securing any objects of harm.       Therapist completed integrated summary, treatment plan, and initiated social history this date.  Therapist is strongly encouraging family involvement in treatment.       ASSESSMENT:      Ms. Sandy Stephenson is a 25-year-old  female from [facility].  Patient has a high school diploma and some cosmetology education.  Patient is currently unemployed.  Her last job was in April, 2020.  She had a  position.  She lives with her grandmother.    Patient was referred to the ER by her primary care physician after patient reports feeling suicidal over the past month.  Patient reports thoughts to overdose or strangle herself.  Patient reports feeling numb with no purpose in life.  Reports that she feels helpless, hopeless, worthless, lacks motivation, crying episodes.  Patient denies any major drug usage.  She does have a positive drug screen for THC.  Patient reports family history of her dad being an alcoholic.  States that she knows that there are several family members with mental health issues but in her family they \"just do not talk about it\".  Patient reports suffering " "from depression since her dad passed away when she was 16 years old.  Patient reports having panic attacks that are ongoing.  And reports that she has \"bad nerves\".  Patient stated that when her parents  when she was 10 years old she pulled her hair out.  Patient has been taking Lexapro which was prescribed by her primary care physician.  Patient reports that this medication was not helpful.  This is the patient's first inpatient hospitalization.  Patient has had outpatient counseling as an adolescent.   Patient was agreeable for therapist to contact her family for collateral.  Patient signed consent for [facility].    Therapist well patient to call her grandmother for emotional support.  Patient was very tearful and upset about being in the hospital.  Grandmother was able to calm patient down and patient was able to refocus about why she is here in the hospital.  Her mother appears to be very supportive and concerned about the patient.  Therapist did discuss safety planning with grandmother and strongly recommended that all firearms, medications and sharps be secured for safety when patient discharges home.  She verbalized understanding.    Therapist provided the patient with a wrap workbook for homework.     PLAN:       Patient to remain hospitalized this date.     Treatment team will focus efforts on stabilizing patient's acute symptoms while providing education on healthy coping and crisis management to reduce hospitalizations.   Patient requires daily psychiatrist evaluation and 24/7 nursing supervision to promote patient  safety.     Therapist will offer 1-4 individual sessions, 1 therapy group daily, family education, and appropriate referral.    Therapist recommends outpatient talk therapy and medication management.  Patient signed a consent for [facility].  She also signed consent to involve family in her treatment.     "

## 2020-07-15 NOTE — H&P
INITIAL PSYCHIATRIC HISTORY & PHYSICAL    Patient Identification:  Name:  @  Age:   25 y.o.  Sex:   female  :   1995  MRN:   [id number]  Visit Number:   [id number]  Primary Care Physician:   Ute Taylor APRN    SUBJECTIVE    CC/Focus of Exam: depression/ anxiety    HPI: Sandy Stephenson is a 25 y.o. female who was admitted on 2020 with complaints of suicidal thoughts.     Ms. 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  Patient states she has been depressed for the past year most recently the symptoms have worsened in the several months prior to admission.  Symptoms include intermittent insomnia, fatigue, difficulties concentrating and focus, social withdrawal, loss of interest in usual interest, and hedonism, weight loss of 7 pounds this past month and the recent sense of hopelessness with suicidal ideation with actual plan to possibly overdose or strangulation.  There is no associated acute psychosocial stressor identified.  Patient has had difficulties with anxiety and a tendency toward obessions and worry without compulsive behavior or rituals for the past 6 to 7 years.  She tends to avoid crowds and has had recurrent panic attacks with shortness of breath palpitations.  She had a syncopal episode in  with a cardiac evaluation that included echocardiogram and Holter monitor studies which were normal.  She states her anxiety and sense of insecurity originated when she was 10 years old when her parents .  There apparently was some discord between her and her mother, her and her younger sister stayed with her father who subsequently developed a problem with alcohol and they moved in with their paternal  grandmother.  Patient admitted to the hospital at this time on a voluntary basis and participating in development of treatment plan.      Available medical/psychiatric records reviewed and incorporated into the current document.     PAST PSYCHIATRIC HX: Patient's PCP has been treating the patient with Lexapro 10 mg daily for the past 3 months.  No prior psychiatric inpatient or outpatient treatment.    SUBSTANCE USE HX:    Patient had been smoking marijuana daily   till approximately 1 month prior to admission when she  lost interest in doing so.  Denies other substance misuse or abuse.  UDS positive for THC.    FAMILY HX: Father is alcohol dependent, no other major mental illness, substance abuse or suicides among first-degree relatives.    SOCIAL HX: See HPI.  Patient has no prior or current legal problems.  No history of being abused in her youth or subsequently.  Dates but no steady boyfriend.  Living with her paternal grandmother on sick leave from her job.    Past Medical History:   Diagnosis Date   • Anxiety    • Depression    • GERD (gastroesophageal reflux disease)    • Headache    • Hypertension    • Obesity    Last normal menstrual period a week prior to admission    Past Surgical History:   Procedure Laterality Date   • GALLBLADDER SURGERY  06/02/2020   • WISDOM TOOTH EXTRACTION  2014       Family History   Problem Relation Age of Onset   • Hypertension Mother    • Thyroid disease Mother    • Cancer Mother         breast   • No Known Problems Father         car wreck    • Breast cancer Paternal Grandmother    • Hypertension Paternal Grandmother    • Arthritis Paternal Grandmother    • Cancer Paternal Grandmother         Breast         Medications Prior to Admission   Medication Sig Dispense Refill Last Dose   • lisinopril (PRINIVIL,ZESTRIL) 10 MG tablet Take 10 mg by mouth Daily.   7/14/2020 at AM   • omeprazole (priLOSEC) 40 MG capsule Take 40 mg by mouth Daily.   7/14/2020 at Unknown time            ALLERGIES:  Patient has no known allergies.    Temp:  [97.1 °F (36.2 °C)-97.9 °F (36.6 °C)] 97.6 °F (36.4 °C)  Heart Rate:  [65-97] 84  Resp:  [16-18] 18  BP: (118-156)/(70-92) 156/86    REVIEW OF SYSTEMS:  Review of Systems   See HPI for psychiatric ROS  OBJECTIVE    PHYSICAL EXAM:  This physical exam has been personally performed remotely in the unit aided by real-time audio/visual communication tools. RN present and assisted during this exam.    Physical Exam:  General: Patient appears awake, alert, and in no acute distress.  Head: Normocephalic, atraumatic  Eyes: EOMI. Conjunctivae and sclerae normal.  Ears: Ears appear intact with no abnormalities noted.   Neck: Trachea midline. No obvious JVD or Thyroid enlargement.   Heart: Vital signs and EKG reviewed   Lungs: Respirations appear to be regular, even and unlabored with no signs of respiratory distress. No audible wheezing.  Abdomen: No obvious abdominal distension.  MS: Muscle tone appears normal. No gross deformities.  Extremities: No clubbing, cyanosis or edema noted.  Skin: No visible bleeding, bruising, or rash.  Neurologic: Alert and oriented x3. No gross focal deficits.     MENTAL STATUS EXAM:               Cooperation:  Cooperative  Eye Contact:  Good  Psychomotor Behavior:  Appropriate  Affect:  Depressed  Hopelessness: 10  Speech:  Normal  Thought Progress:  Linear  Thought Content:  Mood congruent  Suicidal:  Suicidal Ideation  Homicidal:  None  Hallucinations:  None  Delusion:  None  Memory:  Intact  Orientation:  Person, Place, Time and Situation  Reliability:  good  Insight:  Fair  Judgement:  Fair  Impulse Control:  Good      Imaging Results (Last 24 Hours)     ** No results found for the last 24 hours. **           ECG/EMG Results (most recent)     Procedure Component Value Units Date/Time    ECG 12 Lead [[id number]] Collected:  07/14/20 1945     Updated:  07/14/20 7974    Narrative:       Test Reason : Baseline Cardiac Status  Blood  Pressure : **/** mmHG  Vent. Rate : 081 BPM     Atrial Rate : 081 BPM     P-R Int : 158 ms          QRS Dur : 092 ms      QT Int : 390 ms       P-R-T Axes : 044 055 029 degrees     QTc Int : 453 ms    Normal sinus rhythm  Possible Left atrial enlargement  Borderline ECG  No previous ECGs available  Confirmed by Andre Butt ([id number]) on 7/14/2020 11:55:35 PM    Referred By:             Confirmed By:Andre Butt           Lab Results   Component Value Date    GLUCOSE 135 (H) 07/14/2020    BUN 8 07/14/2020    CREATININE 0.65 07/14/2020    EGFRIFNONA 113 07/14/2020    BCR 12.3 07/14/2020    CO2 21.7 (L) 07/14/2020    CALCIUM 9.1 07/14/2020    ALBUMIN 3.79 07/14/2020    AST 12 07/14/2020    ALT 15 07/14/2020       Lab Results   Component Value Date    WBC 13.06 (H) 07/14/2020    HGB 13.3 07/14/2020    HCT 41.8 07/14/2020    MCV 87.8 07/14/2020     07/14/2020       Pain Management Panel     Pain Management Panel Latest Ref Rng & Units 7/14/2020    AMPHETAMINES SCREEN, URINE Negative Negative    BARBITURATES SCREEN Negative Negative    BENZODIAZEPINE SCREEN, URINE Negative Negative    BUPRENORPHINEUR Negative Negative    COCAINE SCREEN, URINE Negative Negative    METHADONE SCREEN, URINE Negative Negative          Brief Urine Lab Results  (Last result in the past 365 days)      Color   Clarity   Blood   Leuk Est   Nitrite   Protein   CREAT   Urine HCG        07/14/20 1710 Yellow Cloudy Negative Negative Negative Negative         07/14/20 1710               Negative           Reviewed labs and studies done with this admission.       ASSESSMENT & PLAN:        MDD (major depressive disorder), single episode  To initiate antidepressant medication with mirtazapine.  Provide for individual and group psychotherapeutic effort.  Patient is on special precautions.  We will check thyroid status as well as alcohol medical issues such as diabetes.      Generalized anxiety disorder  Same applies as above..      The patient  has been admitted for safety and stabilization.  Patient will be monitored for suicidality daily and maintained on Special Precautions Level 3 (q15 min checks)   . The patient will have individual and group therapy with a master's level therapist. A master treatment plan will be developed and agreed upon by the patient and his/her treatment team.  The patient's estimated length of stay in the hospital is 5-7 days.       I spent a total of 70 minutes in direct patient care, greater than 40 minutes (greater than 50%) were spent face-to-face with assessment, coordination of care, counseling,  and answering any questions the patient had regarding her situataion and the treatment plan.     JOYA Carlson MD    07/15/20  11:05 AM

## 2020-07-15 NOTE — PLAN OF CARE
Patient appetite good and slept well. Patient rates anxiety 10 and depression 10. Denies HI and AVH. Pt. States she is SI with plan to strangle herself or overdose. A&Ox3. Will continue to monitor for safety.       Problem: Patient Care Overview  Goal: Plan of Care Review  Outcome: Ongoing (interventions implemented as appropriate)  Flowsheets  Taken 7/15/2020 0410  Progress: improving  Taken 7/14/2020 1924  Plan of Care Reviewed With: patient  Patient Agreement with Plan of Care: agrees  Goal: Individualization and Mutuality  Outcome: Ongoing (interventions implemented as appropriate)  Goal: Discharge Needs Assessment  Outcome: Ongoing (interventions implemented as appropriate)  Goal: Interprofessional Rounds/Family Conf  Outcome: Ongoing (interventions implemented as appropriate)  Goal: Plan of Care Review  Outcome: Ongoing (interventions implemented as appropriate)  Goal: Individualization and Mutuality  Outcome: Ongoing (interventions implemented as appropriate)  Goal: Discharge Needs Assessment  Outcome: Ongoing (interventions implemented as appropriate)  Goal: Interprofessional Rounds/Family Conf  Outcome: Ongoing (interventions implemented as appropriate)     Problem: Overarching Goals (Adult)  Goal: Adheres to Safety Considerations for Self and Others  Outcome: Ongoing (interventions implemented as appropriate)  Goal: Optimized Coping Skills in Response to Life Stressors  Outcome: Ongoing (interventions implemented as appropriate)  Goal: Develops/Participates in Therapeutic [place] to Support Successful Transition  Outcome: Ongoing (interventions implemented as appropriate)     Problem: Depression (Adult,Obstetrics,Pediatric)  Goal: Identify Related Risk Factors and Signs and Symptoms  Outcome: Ongoing (interventions implemented as appropriate)  Goal: Establish/Maintain Self-Care  Outcome: Ongoing (interventions implemented as appropriate)  Goal: Improved/Stable Mood  Outcome: Ongoing (interventions  implemented as appropriate)     Problem: Mood Impairment (Anxiety Signs/Symptoms) (Adult)  Goal: Improved Mood Symptoms (Anxiety Signs/Symptoms)  Outcome: Ongoing (interventions implemented as appropriate)     Problem: Activity/Energy/Vigor Impairment (Anxiety Signs/Symptoms) (Adult)  Goal: Improved Energy/Vigor (Anxiety Signs/Symptoms)  Outcome: Ongoing (interventions implemented as appropriate)     Problem: Sleep Impairment (Anxiety Signs/Symptoms) (Adult)  Goal: Improved Sleep Hygiene (Anxiety Signs/Symptoms)  Outcome: Ongoing (interventions implemented as appropriate)

## 2020-07-15 NOTE — PLAN OF CARE
Problem: Patient Care Overview  Goal: Plan of Care Review  Outcome: Ongoing (interventions implemented as appropriate)  Flowsheets (Taken 7/15/2020 1700)  Progress: improving  Plan of Care Reviewed With: patient  Patient Agreement with Plan of Care: agrees  Note:   CLIENT COOPERATIVE AND NO BEHAVIOR OUTBURSTS DURING THIS SHIFT.

## 2020-07-16 PROBLEM — K21.9 GERD (GASTROESOPHAGEAL REFLUX DISEASE): Status: ACTIVE | Noted: 2020-07-16

## 2020-07-16 PROBLEM — I10 HYPERTENSION: Status: ACTIVE | Noted: 2020-07-16

## 2020-07-16 LAB
HBA1C MFR BLD: 5.7 % (ref 4.8–5.6)
T4 FREE SERPL-MCNC: 1.12 NG/DL (ref 0.93–1.7)
TSH SERPL DL<=0.05 MIU/L-ACNC: 1.39 UIU/ML (ref 0.27–4.2)

## 2020-07-16 PROCEDURE — 84439 ASSAY OF FREE THYROXINE: CPT | Performed by: PSYCHIATRY & NEUROLOGY

## 2020-07-16 PROCEDURE — 84443 ASSAY THYROID STIM HORMONE: CPT | Performed by: PSYCHIATRY & NEUROLOGY

## 2020-07-16 PROCEDURE — 83036 HEMOGLOBIN GLYCOSYLATED A1C: CPT | Performed by: PSYCHIATRY & NEUROLOGY

## 2020-07-16 PROCEDURE — 99232 SBSQ HOSP IP/OBS MODERATE 35: CPT | Performed by: PSYCHIATRY & NEUROLOGY

## 2020-07-16 RX ORDER — MIRTAZAPINE 15 MG/1
TABLET, FILM COATED ORAL
Qty: 46 TABLET | Refills: 0 | Status: SHIPPED | OUTPATIENT
Start: 2020-07-16 | End: 2020-08-06

## 2020-07-16 RX ORDER — MIRTAZAPINE 15 MG/1
15 TABLET, FILM COATED ORAL NIGHTLY
Status: DISCONTINUED | OUTPATIENT
Start: 2020-07-16 | End: 2020-07-17 | Stop reason: HOSPADM

## 2020-07-16 RX ADMIN — LISINOPRIL 10 MG: 10 TABLET ORAL at 08:39

## 2020-07-16 RX ADMIN — MIRTAZAPINE 15 MG: 15 TABLET, FILM COATED ORAL at 20:38

## 2020-07-16 RX ADMIN — PANTOPRAZOLE SODIUM 40 MG: 40 TABLET, DELAYED RELEASE ORAL at 08:39

## 2020-07-16 RX ADMIN — NICOTINE 1 PATCH: 21 PATCH TRANSDERMAL at 08:39

## 2020-07-16 RX ADMIN — HYDROXYZINE HYDROCHLORIDE 50 MG: 50 TABLET ORAL at 08:40

## 2020-07-16 NOTE — PROGRESS NOTES
Patient has the following appt scheduled:     [facility]  [address]  [address]  [place] [id number] [phone]  Appt: July 24th @ 10:30 With Mirtha Katz

## 2020-07-16 NOTE — PLAN OF CARE
"  Problem: Patient Care Overview  Goal: Plan of Care Review  Outcome: Ongoing (interventions implemented as appropriate)  Flowsheets (Taken 7/15/2020 1607)  Progress: no change  Plan of Care Reviewed With: patient  Patient Agreement with Plan of Care: agrees  Note:   Patient rates anxiety 5/10 and depression 4/10. She reports feeling \"more at ease and calm.\" She denies si, hi, and avh. She reports sleep and appetite are good.      "

## 2020-07-16 NOTE — PLAN OF CARE
Problem: Patient Care Overview  Goal: Plan of Care Review  Outcome: Ongoing (interventions implemented as appropriate)  Flowsheets (Taken 7/16/2020 1957)  Progress: improving  Plan of Care Reviewed With: patient  Patient Agreement with Plan of Care: agrees

## 2020-07-16 NOTE — PLAN OF CARE
"  Problem: Patient Care Overview  Goal: Plan of Care Review  Outcome: Ongoing (interventions implemented as appropriate)  Flowsheets  Taken 7/16/2020 0557 by Dariela Hidalgo, RN  Progress: improving  Taken 7/15/2020 1177 by Addis Wesley, RN  Plan of Care Reviewed With: patient  Patient Agreement with Plan of Care: agrees  Note:   Patient socialized with peers and rested throughout the night, she was calm and cooperative during her assessment, she reported to me that she felt \"more at ease and calm\", she rated her anxiety 5/10, depression 4/10, denies SI/HI/AVH.      "

## 2020-07-16 NOTE — PLAN OF CARE
Problem: Patient Care Overview  Goal: Interprofessional Rounds/Family Conf  Flowsheets (Taken 7/16/2020 1015)  Participants: psychiatrist  Summary: Therapist communicated with Dr Carlson for collateral and discharge planning.  Note:   Met with patient along with Dr Carlson during his assessment.  Asked me to provide the patient with education on children of alcoholic parents. Patient will start medications this date. Will also have some labs completed. Will anticipate discharged tomorrow.    Data: Therapist met with patient along with Dr Carlson this date to assess patient's needs. Patient appeared a little frustrated during the assessment. She had told nursing staff earlier that the reason she came here was to get on medications. Patient was agreeable to start low dose medications this date and will follow up at [facility] at discharge. Patient talked about her plans when she gets out of the hospital. States that she plans to go to work soon. States that she is starting a new job at the [employer]. States that she is going to be a . Patient talked about the foundation for her depression. Stated that her depression started when she was young. Stated that her Dad was an alcoholic and we discussed how this affected her long term. Provided patient with educational documentation on Children of Alcoholics. Patient talked about how she felt abandoned by both of her parents and has resentment toward her mother especially. Patient talked about healthy coping and some things she can do at discharge. 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     Assessment: Patient is future oriented. Anxious to discharge home soon. Which is scheduled for tomorrow. Patient has many supports at home with family.     Plan; Patient will plan to discharge home tomorrow. Will follow up at [facility] for discharge. Grandmother will transport her home.
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
## 2020-07-17 VITALS
DIASTOLIC BLOOD PRESSURE: 81 MMHG | SYSTOLIC BLOOD PRESSURE: 121 MMHG | WEIGHT: 228.5 LBS | HEART RATE: 85 BPM | OXYGEN SATURATION: 98 % | TEMPERATURE: 98 F | BODY MASS INDEX: 32.79 KG/M2 | RESPIRATION RATE: 18 BRPM

## 2020-07-17 PROBLEM — F32.2 CURRENT SEVERE EPISODE OF MAJOR DEPRESSIVE DISORDER WITHOUT PSYCHOTIC FEATURES WITHOUT PRIOR EPISODE (HCC): Status: ACTIVE | Noted: 2020-07-14

## 2020-07-17 PROCEDURE — 99239 HOSP IP/OBS DSCHRG MGMT >30: CPT | Performed by: PSYCHIATRY & NEUROLOGY

## 2020-07-17 RX ADMIN — LISINOPRIL 10 MG: 10 TABLET ORAL at 08:38

## 2020-07-17 RX ADMIN — NICOTINE 1 PATCH: 21 PATCH TRANSDERMAL at 08:38

## 2020-07-17 RX ADMIN — PANTOPRAZOLE SODIUM 40 MG: 40 TABLET, DELAYED RELEASE ORAL at 08:38

## 2020-07-17 NOTE — DISCHARGE SUMMARY
"      PSYCHIATRIC DISCHARGE SUMMARY     Patient Identification:  Name:  Sandy Stephenson  Age:  25 y.o.  Sex:  female  :  1995  MRN:  [id number]  Visit Number:  [id number]    Date of Admission:2020   Date of Discharge:  2020    Discharge Diagnosis:  Active Problems:    Current severe episode of major depressive disorder without psychotic features without prior episode (CMS/HCC)    Generalized anxiety disorder    Hypertension    GERD (gastroesophageal reflux disease)      Admission Diagnosis:  MDD (major depressive disorder), single episode [F32.9]     Hospital Course  Patient is a 25 y.o. female presented with depression and anxiety with SI.  Family and relationship stressors are primary causes of distress.  Patient reports history of anxiety related to compulsive behavior and social anxiety, intermixed with panic attacks that prompted a cardiac work-up last month.  Patient started on mirtazapine and tolerated it well.  Participated well on the unit and engaged and appropriate disposition planning.  Future oriented, excited to return to work next week and ready to see her family this weekend.  Hemoglobin A1c found to be mildly elevated and dietary changes were recommended to the patient, who voiced agreement.  Other labs were within reasonably normal limits.  Patient encouraged to avoid mind altering substances.    On the day of discharge, patient denied SI, HI or AVH.  Patient showed improvement of presenting symptoms and was deemed appropriate for discharge today.    Mental Status Exam upon discharge:   Mood \" better\"   Affect-congruent, appropriate, stable  Thought Content-goal directed, no delusional material present  Thought process-linear, organized.  Suicidality: No SI  Homicidality: No HI  Perception: No AH/VH    Procedures Performed         Consults:   Consults     No orders found from 6/15/2020 to 7/15/2020.          Pertinent Test Results:   Lab Results (last 7 days)     Procedure Component " Value Units Date/Time    TSH [[id number]]  (Normal) Collected:  07/16/20 1450    Specimen:  Blood Updated:  07/16/20 1611     TSH 1.390 uIU/mL     T4, Free [[id number]]  (Normal) Collected:  07/16/20 1450    Specimen:  Blood Updated:  07/16/20 1611     Free T4 1.12 ng/dL     Narrative:       Results may be falsely increased if patient taking Biotin.      Hemoglobin A1c [[id number]]  (Abnormal) Collected:  07/16/20 1450    Specimen:  Blood Updated:  07/16/20 1551     Hemoglobin A1C 5.70 %     Narrative:       Hemoglobin A1C Ranges:    Increased Risk for Diabetes  5.7% to 6.4%  Diabetes                     >= 6.5%  Diabetic Goal                < 7.0%          Condition on Discharge:  improved    Vital Signs  Temp:  [96.9 °F (36.1 °C)-97.4 °F (36.3 °C)] 97.4 °F (36.3 °C)  Heart Rate:  [68-88] 72  Resp:  [18] 18  BP: (114-138)/(63-94) 138/94    Discharge Disposition:  Home or Self Care    Discharge Medications:     Discharge Medications      New Medications      Instructions Start Date   mirtazapine 15 MG tablet  Commonly known as:  REMERON   Take 1 tablet by mouth every night at bedtime for 2 days, THEN 2 tablets every night at bedtime. Indications: Major Depressive Disorder   Start Date:  July 16, 2020        Continue These Medications      Instructions Start Date   lisinopril 10 MG tablet  Commonly known as:  PRINIVIL,ZESTRIL   10 mg, Oral, Daily      omeprazole 40 MG capsule  Commonly known as:  priLOSEC   40 mg, Oral, Daily             Discharge Diet: Normal    Activity at Discharge: Normal    Follow-up Appointments  No future appointments.      Test Results Pending at Discharge  None     Clinician:   Boby Bethea MD  07/17/20  09:53

## 2020-07-21 NOTE — PROGRESS NOTES
Behavioral Health Discharge Summary             Please fax within 24 hours of discharge to [facility] at: [phone]      Member Name: Sandy Stephenson Member ID: [id number]   Authorization Number: [id number] Phone: [phone]   Member Address: [address] [place] [address]   Discharge Date: 07/17/2020 Level of Care at Discharge:    Facility: [facility] Staff Completing Form: MARILEE MUNIZ RN    If the member is being discharged directly to a residential or extended care program, please specify the type below.   __Private Child-Caring Facility (PCC) Residential/Group Home   __Private Child-Caring Facility (PCC) Therapeutic Foster Care   __Residential Treatment Facility (RTF)   __Psychiatric Residential Treatment Facility (PRTF I or II)   __Long-Term Acute Inpatient Hospital Services or Extended Care Unit (ECU)   __Other (please specify):    Brief discharge summary of treatment received (for follow up by the case management team): D/C clinical with list of medications and follow up appts given to patient upon discharge.     BRIEF SUMMARY OF RECOMMENDATIONS FOR ONGOING TREATMENT     Discharged to where: Home   Discharge diagnoses: F 32.9   Axis I:    Axis II:    Axis III:    Axis IV:    Axis V:    Does the member understand his/her DX?  Yes          Medication     Dose     Schedule Supply/  Quantity  Given at Discharge RX Provided  Yes/No  If Rx Provided, Quantity RX Prior Auth Required  Yes/No Prior Auth  Completed   Remeron  15 mg  HS                                                                                        Does the member understand the reason for taking these medications? Yes                                                           FOLLOW-UP APPOINTMENTS   Please schedule within 7 days of discharge and provide appointment details for all referred services.    PCP/Other Providers Involved in Treatment:    Appointment Type: MERARI BURNS  Provider Name: [facility]  Provider Phone: [phone] Appointment Date: 07/24/2020 Appointment Time: 10:30 AM with Mirtha Manuel   (new to OP services)        Case Management    Is the member already enrolled in case management?  Yes/No  If yes, date the CM was notified:    If no, was the CM referral offered?  Yes/No  Accepted? Yes/No    Is the Release of Information in the chart? Yes/No:      Medication Management (for member discharged with psychiatric medications):      A&D Treatment (for member with substance abuse/   dependence in the past year):      Medical Condition (for member with a medical condition):    Other recommended treatment:    Do you have any concerns about the discharge plan?  No    If yes, explain:    Was the member involved in the discharge planning?  Yes    If no, explain:    Was a copy of the discharge plan provided to the member?  Yes    If no, explain:

## 2020-08-06 ENCOUNTER — OFFICE VISIT (OUTPATIENT)
Dept: FAMILY MEDICINE CLINIC | Facility: CLINIC | Age: 25
End: 2020-08-06

## 2020-08-06 VITALS
HEART RATE: 78 BPM | OXYGEN SATURATION: 98 % | SYSTOLIC BLOOD PRESSURE: 118 MMHG | TEMPERATURE: 98 F | DIASTOLIC BLOOD PRESSURE: 70 MMHG | BODY MASS INDEX: 33.92 KG/M2 | HEIGHT: 69 IN | WEIGHT: 229 LBS

## 2020-08-06 DIAGNOSIS — Z91.09 ENVIRONMENTAL ALLERGIES: ICD-10-CM

## 2020-08-06 DIAGNOSIS — F41.1 GAD (GENERALIZED ANXIETY DISORDER): Primary | ICD-10-CM

## 2020-08-06 PROCEDURE — 99213 OFFICE O/P EST LOW 20 MIN: CPT | Performed by: NURSE PRACTITIONER

## 2020-08-06 RX ORDER — LORATADINE 10 MG/1
10 TABLET ORAL DAILY
Qty: 30 TABLET | Refills: 5 | Status: SHIPPED | OUTPATIENT
Start: 2020-08-06 | End: 2021-04-22

## 2020-08-06 RX ORDER — FLUTICASONE PROPIONATE 50 MCG
2 SPRAY, SUSPENSION (ML) NASAL DAILY
Qty: 16 G | Refills: 11 | Status: SHIPPED | OUTPATIENT
Start: 2020-08-06

## 2020-08-06 RX ORDER — MIRTAZAPINE 15 MG/1
15 TABLET, FILM COATED ORAL NIGHTLY
Qty: 30 TABLET | Refills: 2
Start: 2020-08-06 | End: 2020-11-03

## 2020-08-06 RX ORDER — PROPRANOLOL HYDROCHLORIDE 10 MG/1
10 TABLET ORAL 2 TIMES DAILY
Qty: 60 TABLET | Refills: 2
Start: 2020-08-06

## 2020-08-06 NOTE — PROGRESS NOTES
Subjective   Sandy Stephenson is a 25 y.o. female.     Chief Complaint: Hypertension    Anxiety   Presents for follow-up visit. Symptoms include nervous/anxious behavior. Patient reports no suicidal ideas. Symptoms occur most days. The severity of symptoms is causing significant distress. The quality of sleep is good. Nighttime awakenings: occasional.     (Environmental allergies) Compliance with medications is % (pt was recently admitted to [facility] and is now taking remeron at bedtime and also taking propanolol 10mg bId for symtpoms.  She is doing much better and her symptoms are well controlled.  She is continuing to follow with therapist).   Earache    There is pain in the left ear. This is a new problem. The current episode started in the past 7 days. The problem occurs constantly. The problem has been unchanged. There has been no fever. The pain is mild. She has tried nothing for the symptoms. environmental allergies             Family History   Problem Relation Age of Onset   • Hypertension Mother    • Thyroid disease Mother    • Cancer Mother         breast   • No Known Problems Father         car wreck    • Breast cancer Paternal Grandmother    • Hypertension Paternal Grandmother    • Arthritis Paternal Grandmother    • Cancer Paternal Grandmother         Breast       Social History     Socioeconomic History   • Marital status: Single     Spouse name: Not on file   • Number of children: Not on file   • Years of education: Not on file   • Highest education level: Not on file   Tobacco Use   • Smoking status: Current Every Day Smoker     Packs/day: 1.00     Years: 6.00     Pack years: 6.00     Types: Cigarettes   • Smokeless tobacco: Never Used   Substance and Sexual Activity   • Alcohol use: Yes     Frequency: Monthly or less     Comment: pt stated she has only been drinking for about a week and its 2-3 whitclaws daily.   • Drug use: No     Comment: Pt stated she has been smoking THC on and off for  "2-3yrs. Last smoked yesterday.   • Sexual activity: Yes     Partners: Male     Birth control/protection: None       Past Medical History:   Diagnosis Date   • Anxiety    • Depression    • GERD (gastroesophageal reflux disease)    • Headache    • Hypertension    • Obesity        Review of Systems   Constitutional: Negative.    HENT: Positive for ear pain.    Respiratory: Negative.    Cardiovascular: Negative.    Gastrointestinal: Negative.    Musculoskeletal: Negative.    Skin: Negative.    Neurological: Negative.    Psychiatric/Behavioral: Negative for suicidal ideas. The patient is nervous/anxious.        Objective   Physical Exam   Constitutional: She is oriented to person, place, and time. She appears well-developed and well-nourished.   HENT:   Fluid left TM   Neck: Normal range of motion. Neck supple.   Cardiovascular: Normal rate, regular rhythm and normal heart sounds.   Pulmonary/Chest: Effort normal and breath sounds normal.   Neurological: She is alert and oriented to person, place, and time.   Skin: Skin is warm and dry.   Psychiatric: She has a normal mood and affect. Her behavior is normal. Judgment and thought content normal.   Nursing note and vitals reviewed.      Procedures    Vitals: Blood pressure 118/70, pulse 78, temperature 98 °F (36.7 °C), height 175.3 cm (69\"), weight 104 kg (229 lb), last menstrual period 07/10/2020, SpO2 98 %, not currently breastfeeding.    Body mass index is 33.82 kg/m².     Allergies: No Known Allergies     During this visit the following were done:  Labs Reviewed []    Labs Ordered []    Radiology Reports Reviewed []    Radiology Ordered []    PCP Records Reviewed []    Referring Provider Records Reviewed []    ER Records Reviewed []    Hospital Records Reviewed []    History Obtained From Family []    Radiology Images Reviewed []    Other Reviewed []    Records Requested []      Assessment/Plan   Sandy was seen today for hypertension.    Diagnoses and all orders for " this visit:    FREDI (generalized anxiety disorder)   Continue mirtazapine (Remeron) 15 MG tablet; Take 1 tablet by mouth Every Night.  -     Continue propranolol (INDERAL) 10 MG tablet; Take 1 tablet by mouth 2 (Two) Times a Day.      Environmental allergies  -     loratadine (Claritin) 10 MG tablet; Take 1 tablet by mouth Daily.  -     fluticasone (Flonase) 50 MCG/ACT nasal spray; 2 sprays into the nostril(s) as directed by provider Daily.

## 2020-08-25 ENCOUNTER — TELEPHONE (OUTPATIENT)
Dept: FAMILY MEDICINE CLINIC | Facility: CLINIC | Age: 25
End: 2020-08-25

## 2020-08-25 ENCOUNTER — OFFICE VISIT (OUTPATIENT)
Dept: FAMILY MEDICINE CLINIC | Facility: CLINIC | Age: 25
End: 2020-08-25

## 2020-08-25 DIAGNOSIS — R50.9 FEVER, UNSPECIFIED FEVER CAUSE: Primary | ICD-10-CM

## 2020-08-25 PROCEDURE — 99441 PR PHYS/QHP TELEPHONE EVALUATION 5-10 MIN: CPT | Performed by: NURSE PRACTITIONER

## 2020-08-25 RX ORDER — BUSPIRONE HYDROCHLORIDE 10 MG/1
10 TABLET ORAL 2 TIMES DAILY
COMMUNITY

## 2020-08-25 NOTE — TELEPHONE ENCOUNTER
Pt advised of [facility] testing center phone number. Pt is aware of being tested.

## 2020-08-25 NOTE — TELEPHONE ENCOUNTER
Bri from the [facility] testing center called requesting an order be placed for patient to be tested & then call her back & she will schedule her.

## 2020-08-26 ENCOUNTER — LAB (OUTPATIENT)
Dept: LAB | Facility: HOSPITAL | Age: 25
End: 2020-08-26

## 2020-08-26 DIAGNOSIS — R50.9 FEVER, UNSPECIFIED FEVER CAUSE: ICD-10-CM

## 2020-08-26 PROCEDURE — U0002 COVID-19 LAB TEST NON-CDC: HCPCS

## 2020-08-26 PROCEDURE — U0004 COV-19 TEST NON-CDC HGH THRU: HCPCS

## 2020-08-26 PROCEDURE — C9803 HOPD COVID-19 SPEC COLLECT: HCPCS

## 2020-08-27 LAB
REF LAB TEST METHOD: NORMAL
SARS-COV-2 RNA RESP QL NAA+PROBE: NOT DETECTED

## 2020-08-30 NOTE — PROGRESS NOTES
Subjective   Sandy Stephenson is a 25 y.o. female.     You have chosen to receive care through a telephone visit. Do you consent to use a telephone visit for your medical care today? Yes  Length of call 5 min      Sore Throat    This is a new problem. Episode onset: Three days ago  The problem has been unchanged. Neither side of throat is experiencing more pain than the other. The maximum temperature recorded prior to her arrival was 100.4 - 100.9 F. The fever has been present for 1 to 2 days. The pain is mild. Associated symptoms include diarrhea, ear pain and headaches. She has had no exposure to strep or mono. Exposure to: Denies any COVID contact known works from home. She has tried acetaminophen for the symptoms. The treatment provided mild relief.        The following portions of the patient's history were reviewed and updated as appropriate: allergies, current medications, past family history, past medical history, past social history, past surgical history and problem list.    Review of Systems   Constitutional: Positive for activity change, fatigue and fever.   HENT: Positive for ear pain and sore throat.    Respiratory: Negative.    Cardiovascular: Negative.    Gastrointestinal: Positive for diarrhea.   All other systems reviewed and are negative.      Objective   Physical Exam  NOT PERFORMED Telephone visit    Assessment/Plan   Sandy was seen today for earache, sore throat and diarrhea.    Diagnoses and all orders for this visit:    Fever, unspecified fever cause      Sent for COVID screening.  Instructed to quarantine until free of symptoms without any medications for 24 hours.   RTC if symptoms persist or worsen in any way

## 2020-08-31 ENCOUNTER — TELEPHONE (OUTPATIENT)
Dept: FAMILY MEDICINE CLINIC | Facility: CLINIC | Age: 25
End: 2020-08-31

## 2020-08-31 NOTE — TELEPHONE ENCOUNTER
----- Message from LUIS MIGUEL Jackson sent at 8/30/2020  8:48 AM EDT -----  Let patient know her COVID screen is negative    Patient notified.

## 2020-11-03 ENCOUNTER — OFFICE VISIT (OUTPATIENT)
Dept: FAMILY MEDICINE CLINIC | Facility: CLINIC | Age: 25
End: 2020-11-03

## 2020-11-03 VITALS
HEIGHT: 69 IN | DIASTOLIC BLOOD PRESSURE: 70 MMHG | WEIGHT: 237 LBS | TEMPERATURE: 97.5 F | SYSTOLIC BLOOD PRESSURE: 118 MMHG | BODY MASS INDEX: 35.1 KG/M2 | OXYGEN SATURATION: 98 % | HEART RATE: 115 BPM

## 2020-11-03 DIAGNOSIS — R19.7 DIARRHEA, UNSPECIFIED TYPE: ICD-10-CM

## 2020-11-03 DIAGNOSIS — R30.0 DYSURIA: Primary | ICD-10-CM

## 2020-11-03 LAB
BILIRUB BLD-MCNC: NEGATIVE MG/DL
CLARITY, POC: CLEAR
COLOR UR: YELLOW
GLUCOSE UR STRIP-MCNC: NEGATIVE MG/DL
KETONES UR QL: NEGATIVE
LEUKOCYTE EST, POC: NEGATIVE
NITRITE UR-MCNC: NEGATIVE MG/ML
PH UR: 6 [PH] (ref 5–8)
PROT UR STRIP-MCNC: ABNORMAL MG/DL
RBC # UR STRIP: NEGATIVE /UL
SP GR UR: 1.03 (ref 1–1.03)
UROBILINOGEN UR QL: NORMAL

## 2020-11-03 PROCEDURE — 99213 OFFICE O/P EST LOW 20 MIN: CPT | Performed by: NURSE PRACTITIONER

## 2020-11-03 RX ORDER — OMEPRAZOLE 40 MG/1
40 CAPSULE, DELAYED RELEASE ORAL DAILY
COMMUNITY
Start: 2020-08-24 | End: 2020-12-15

## 2020-11-03 RX ORDER — LISINOPRIL 20 MG/1
20 TABLET ORAL DAILY
COMMUNITY
Start: 2020-08-06

## 2020-11-03 RX ORDER — ESCITALOPRAM OXALATE 10 MG/1
10 TABLET ORAL DAILY
COMMUNITY
Start: 2020-08-06 | End: 2021-03-09 | Stop reason: SDUPTHER

## 2020-11-03 RX ORDER — MIRTAZAPINE 30 MG/1
30 TABLET, FILM COATED ORAL NIGHTLY
COMMUNITY
Start: 2020-08-24

## 2020-11-03 NOTE — PROGRESS NOTES
Subjective   Sandy Stephenson is a 25 y.o. female.     Chief Complaint: Rectal Bleeding    Rectal Bleeding  This is a new problem. The current episode started yesterday. The problem has been resolved. Associated symptoms include abdominal pain. Pertinent negatives include no chills, fever, nausea or vomiting. Exacerbated by: constipation, diarrhea. She has tried nothing for the symptoms.   Diarrhea   This is a recurrent problem. The current episode started 1 to 4 weeks ago. The problem occurs 2 to 4 times per day. The problem has been waxing and waning. The stool consistency is described as blood tinged and watery (x1 episode). The patient states that diarrhea does not awaken her from sleep. Associated symptoms include abdominal pain. Pertinent negatives include no chills, fever, vomiting or weight loss. Nothing aggravates the symptoms. There are no known risk factors. She has tried nothing for the symptoms.        Family History   Problem Relation Age of Onset   • Hypertension Mother    • Thyroid disease Mother    • Cancer Mother         breast   • No Known Problems Father         car wreck    • Breast cancer Paternal Grandmother    • Hypertension Paternal Grandmother    • Arthritis Paternal Grandmother    • Cancer Paternal Grandmother         Breast       Social History     Socioeconomic History   • Marital status: Single     Spouse name: Not on file   • Number of children: Not on file   • Years of education: Not on file   • Highest education level: Not on file   Tobacco Use   • Smoking status: Current Every Day Smoker     Packs/day: 1.00     Years: 6.00     Pack years: 6.00     Types: Cigarettes   • Smokeless tobacco: Never Used   Substance and Sexual Activity   • Alcohol use: Yes     Frequency: Monthly or less     Comment: pt stated she has only been drinking for about a week and its 2-3 whitclaws daily.   • Drug use: No     Comment: Pt stated she has been smoking THC on and off for 2-3yrs. Last smoked yesterday.   •  "Sexual activity: Yes     Partners: Male     Birth control/protection: None       Past Medical History:   Diagnosis Date   • Anxiety    • Depression    • GERD (gastroesophageal reflux disease)    • Headache    • Hypertension    • Obesity        Review of Systems   Constitutional: Negative.  Negative for chills, fever and weight loss.   HENT: Negative.    Respiratory: Negative.    Cardiovascular: Negative.    Gastrointestinal: Positive for abdominal pain, diarrhea and hematochezia. Negative for nausea and vomiting.   Musculoskeletal: Negative.    Skin: Negative.    Neurological: Negative.    Psychiatric/Behavioral: Negative.        Objective   Physical Exam  Vitals signs and nursing note reviewed.   Constitutional:       Appearance: She is well-developed.   Neck:      Musculoskeletal: Normal range of motion and neck supple.   Cardiovascular:      Rate and Rhythm: Normal rate.   Pulmonary:      Effort: Pulmonary effort is normal.   Skin:     General: Skin is warm and dry.   Neurological:      Mental Status: She is alert and oriented to person, place, and time.   Psychiatric:         Behavior: Behavior normal.         Thought Content: Thought content normal.         Judgment: Judgment normal.         Procedures    Vitals: Blood pressure 118/70, pulse 115, temperature 97.5 °F (36.4 °C), height 175.3 cm (69\"), weight 108 kg (237 lb), SpO2 98 %, not currently breastfeeding.    Body mass index is 35 kg/m².     Allergies: No Known Allergies     During this visit the following were done:  Labs Reviewed []    Labs Ordered []    Radiology Reports Reviewed []    Radiology Ordered []    PCP Records Reviewed []    Referring Provider Records Reviewed []    ER Records Reviewed []    Hospital Records Reviewed []    History Obtained From Family []    Radiology Images Reviewed []    Other Reviewed []    Records Requested []      Assessment/Plan   Diagnoses and all orders for this visit:    1. Dysuria (Primary)  -     POCT urinalysis " dipstick, automated    2. Diarrhea, unspecified type  -     POCT occult blood x 1 stool  -     Stool Culture (Reference Lab) - Stool, Per Rectum; Future  -     Ova & Parasite Examination - Stool, Per Rectum; Future

## 2020-11-04 PROCEDURE — 87046 STOOL CULTR AEROBIC BACT EA: CPT | Performed by: NURSE PRACTITIONER

## 2020-11-04 PROCEDURE — 87045 FECES CULTURE AEROBIC BACT: CPT | Performed by: NURSE PRACTITIONER

## 2020-11-04 PROCEDURE — 87209 SMEAR COMPLEX STAIN: CPT | Performed by: NURSE PRACTITIONER

## 2020-11-04 PROCEDURE — 87427 SHIGA-LIKE TOXIN AG IA: CPT | Performed by: NURSE PRACTITIONER

## 2020-11-04 PROCEDURE — 87177 OVA AND PARASITES SMEARS: CPT | Performed by: NURSE PRACTITIONER

## 2020-11-06 LAB
O+P SPEC MICRO: NORMAL
O+P STL CONC: NORMAL

## 2020-11-09 LAB
BACTERIA SPEC CULT: NORMAL
BACTERIA SPEC CULT: NORMAL
CAMPYLOBACTER STL CULT: NORMAL
E COLI SXT STL QL IA: NEGATIVE
SALM + SHIG STL CULT: NORMAL

## 2020-12-15 RX ORDER — OMEPRAZOLE 40 MG/1
CAPSULE, DELAYED RELEASE ORAL
Qty: 30 CAPSULE | Refills: 2 | Status: SHIPPED | OUTPATIENT
Start: 2020-12-15 | End: 2021-04-22

## 2021-02-23 ENCOUNTER — OFFICE VISIT (OUTPATIENT)
Dept: FAMILY MEDICINE CLINIC | Facility: CLINIC | Age: 26
End: 2021-02-23

## 2021-02-23 VITALS
SYSTOLIC BLOOD PRESSURE: 128 MMHG | OXYGEN SATURATION: 99 % | HEART RATE: 107 BPM | HEIGHT: 69 IN | DIASTOLIC BLOOD PRESSURE: 88 MMHG | TEMPERATURE: 97.3 F | BODY MASS INDEX: 37.03 KG/M2 | WEIGHT: 250 LBS

## 2021-02-23 DIAGNOSIS — R10.12 LUQ ABDOMINAL PAIN: Primary | ICD-10-CM

## 2021-02-23 LAB
B-HCG UR QL: NEGATIVE
BILIRUB BLD-MCNC: NEGATIVE MG/DL
CLARITY, POC: CLEAR
COLOR UR: YELLOW
GLUCOSE UR STRIP-MCNC: NEGATIVE MG/DL
INTERNAL NEGATIVE CONTROL: NEGATIVE
INTERNAL POSITIVE CONTROL: POSITIVE
KETONES UR QL: NEGATIVE
LEUKOCYTE EST, POC: NEGATIVE
Lab: NORMAL
NITRITE UR-MCNC: NEGATIVE MG/ML
PH UR: 6 [PH] (ref 5–8)
PROT UR STRIP-MCNC: NEGATIVE MG/DL
RBC # UR STRIP: NEGATIVE /UL
SP GR UR: 1.02 (ref 1–1.03)
UROBILINOGEN UR QL: NORMAL

## 2021-02-23 PROCEDURE — 99213 OFFICE O/P EST LOW 20 MIN: CPT | Performed by: NURSE PRACTITIONER

## 2021-02-23 PROCEDURE — 81025 URINE PREGNANCY TEST: CPT | Performed by: NURSE PRACTITIONER

## 2021-02-23 PROCEDURE — 81003 URINALYSIS AUTO W/O SCOPE: CPT | Performed by: NURSE PRACTITIONER

## 2021-02-23 NOTE — PROGRESS NOTES
"Chief Complaint  Flank Pain (left side, under breast )    Subjective          Sandy Stephenson presents to [facility]  Abdominal Pain  This is a new problem. The current episode started more than 1 month ago. The problem occurs daily. The pain is located in the LUQ. The quality of the pain is sharp. The abdominal pain radiates to the left flank. Associated symptoms include constipation, diarrhea, nausea and vomiting. Pertinent negatives include no fever. Associated symptoms comments: Pain can last up to 30 minutes at a time. Nothing aggravates the pain. The pain is relieved by nothing. She has tried nothing for the symptoms.   LMP 2/1/2021    Review of Systems   Constitutional: Negative for fever.   Gastrointestinal: Positive for abdominal pain, constipation, diarrhea, nausea and vomiting.     Objective   Vital Signs:   /88 (BP Location: Right arm, Patient Position: Sitting, Cuff Size: Adult)   Pulse 107   Temp 97.3 °F (36.3 °C)   Ht 175.3 cm (69\")   Wt 113 kg (250 lb)   SpO2 99%   BMI 36.92 kg/m²     Physical Exam   Result Review :                 Assessment and Plan    Diagnoses and all orders for this visit:    1. LUQ abdominal pain (Primary)  -     POCT urinalysis dipstick, automated  -     POC Pregnancy, Urine  -     XR Abdomen KUB; Future        Follow Up   No follow-ups on file.  Patient was given instructions and counseling regarding her condition or for health maintenance advice. Please see specific information pulled into the AVS if appropriate.       "

## 2021-02-24 ENCOUNTER — HOSPITAL ENCOUNTER (OUTPATIENT)
Dept: GENERAL RADIOLOGY | Facility: HOSPITAL | Age: 26
Discharge: HOME | End: 2021-02-24
Admitting: NURSE PRACTITIONER

## 2021-02-24 DIAGNOSIS — R10.12 LUQ ABDOMINAL PAIN: ICD-10-CM

## 2021-02-24 PROCEDURE — 74018 RADEX ABDOMEN 1 VIEW: CPT

## 2021-02-24 PROCEDURE — 74018 RADEX ABDOMEN 1 VIEW: CPT | Performed by: RADIOLOGY

## 2021-02-25 ENCOUNTER — TELEPHONE (OUTPATIENT)
Dept: FAMILY MEDICINE CLINIC | Facility: CLINIC | Age: 26
End: 2021-02-25

## 2021-02-25 NOTE — TELEPHONE ENCOUNTER
----- Message from LUIS MIGUEL Roberson sent at 2/25/2021 10:43 AM EST -----  Her abdominal xray is normal.  Please let her know.      Irais notified.

## 2021-03-09 ENCOUNTER — TELEPHONE (OUTPATIENT)
Dept: FAMILY MEDICINE CLINIC | Facility: CLINIC | Age: 26
End: 2021-03-09

## 2021-03-09 RX ORDER — ESCITALOPRAM OXALATE 20 MG/1
20 TABLET ORAL DAILY
Qty: 30 TABLET | Refills: 5 | Status: SHIPPED | OUTPATIENT
Start: 2021-03-09

## 2021-03-09 RX ORDER — ESCITALOPRAM OXALATE 10 MG/1
10 TABLET ORAL DAILY
Status: CANCELLED | OUTPATIENT
Start: 2021-03-09

## 2021-03-09 NOTE — TELEPHONE ENCOUNTER
Patient states that she needs Lexapro but says that Ute increased that to 20 mg, instead of 10 mg sent to [facility].  Patient states that she is out of the medication.  She can be reached at [phone]

## 2021-04-21 DIAGNOSIS — Z91.09 ENVIRONMENTAL ALLERGIES: ICD-10-CM

## 2021-04-22 RX ORDER — LORATADINE 10 MG/1
TABLET ORAL
Qty: 30 TABLET | Refills: 5 | Status: SHIPPED | OUTPATIENT
Start: 2021-04-22

## 2021-04-22 RX ORDER — OMEPRAZOLE 40 MG/1
CAPSULE, DELAYED RELEASE ORAL
Qty: 30 CAPSULE | Refills: 2 | Status: SHIPPED | OUTPATIENT
Start: 2021-04-22

## 2021-07-29 ENCOUNTER — APPOINTMENT (OUTPATIENT)
Dept: ULTRASOUND IMAGING | Facility: HOSPITAL | Age: 26
End: 2021-07-29

## 2021-07-29 ENCOUNTER — HOSPITAL ENCOUNTER (EMERGENCY)
Facility: HOSPITAL | Age: 26
Discharge: HOME | End: 2021-07-29
Attending: EMERGENCY MEDICINE | Admitting: EMERGENCY MEDICINE

## 2021-07-29 VITALS
OXYGEN SATURATION: 100 % | WEIGHT: 250 LBS | TEMPERATURE: 98.2 F | RESPIRATION RATE: 18 BRPM | BODY MASS INDEX: 37.03 KG/M2 | HEIGHT: 69 IN | SYSTOLIC BLOOD PRESSURE: 167 MMHG | HEART RATE: 74 BPM | DIASTOLIC BLOOD PRESSURE: 102 MMHG

## 2021-07-29 DIAGNOSIS — N30.01 ACUTE CYSTITIS WITH HEMATURIA: ICD-10-CM

## 2021-07-29 DIAGNOSIS — O20.0 THREATENED ABORTION: Primary | ICD-10-CM

## 2021-07-29 LAB
ABO GROUP BLD: NORMAL
ALBUMIN SERPL-MCNC: 4.29 G/DL (ref 3.5–5.2)
ALBUMIN/GLOB SERPL: 1.4 G/DL
ALP SERPL-CCNC: 97 U/L (ref 39–117)
ALT SERPL W P-5'-P-CCNC: 16 U/L (ref 1–33)
ANION GAP SERPL CALCULATED.3IONS-SCNC: 12.7 MMOL/L (ref 5–15)
AST SERPL-CCNC: 15 U/L (ref 1–32)
BACTERIA UR QL AUTO: ABNORMAL /HPF
BASOPHILS # BLD AUTO: 0.03 10*3/MM3 (ref 0–0.2)
BASOPHILS NFR BLD AUTO: 0.2 % (ref 0–1.5)
BILIRUB SERPL-MCNC: 0.3 MG/DL (ref 0–1.2)
BILIRUB UR QL STRIP: NEGATIVE
BLD GP AB SCN SERPL QL: NEGATIVE
BUN SERPL-MCNC: 10 MG/DL (ref 6–20)
BUN/CREAT SERPL: 13.5 (ref 7–25)
CALCIUM SPEC-SCNC: 9.4 MG/DL (ref 8.6–10.5)
CHLORIDE SERPL-SCNC: 107 MMOL/L (ref 98–107)
CLARITY UR: CLEAR
CO2 SERPL-SCNC: 21.3 MMOL/L (ref 22–29)
COLOR UR: ABNORMAL
CREAT SERPL-MCNC: 0.74 MG/DL (ref 0.57–1)
DEPRECATED RDW RBC AUTO: 44 FL (ref 37–54)
EOSINOPHIL # BLD AUTO: 0.09 10*3/MM3 (ref 0–0.4)
EOSINOPHIL NFR BLD AUTO: 0.7 % (ref 0.3–6.2)
ERYTHROCYTE [DISTWIDTH] IN BLOOD BY AUTOMATED COUNT: 14.5 % (ref 12.3–15.4)
GFR SERPL CREATININE-BSD FRML MDRD: 96 ML/MIN/1.73
GLOBULIN UR ELPH-MCNC: 3.1 GM/DL
GLUCOSE SERPL-MCNC: 102 MG/DL (ref 65–99)
GLUCOSE UR STRIP-MCNC: NEGATIVE MG/DL
HCG INTACT+B SERPL-ACNC: 23.85 MIU/ML
HCT VFR BLD AUTO: 40.8 % (ref 34–46.6)
HGB BLD-MCNC: 12.7 G/DL (ref 12–15.9)
HGB UR QL STRIP.AUTO: ABNORMAL
HYALINE CASTS UR QL AUTO: ABNORMAL /LPF
IMM GRANULOCYTES # BLD AUTO: 0.04 10*3/MM3 (ref 0–0.05)
IMM GRANULOCYTES NFR BLD AUTO: 0.3 % (ref 0–0.5)
KETONES UR QL STRIP: NEGATIVE
LEUKOCYTE ESTERASE UR QL STRIP.AUTO: ABNORMAL
LYMPHOCYTES # BLD AUTO: 2.11 10*3/MM3 (ref 0.7–3.1)
LYMPHOCYTES NFR BLD AUTO: 15.4 % (ref 19.6–45.3)
MCH RBC QN AUTO: 26 PG (ref 26.6–33)
MCHC RBC AUTO-ENTMCNC: 31.1 G/DL (ref 31.5–35.7)
MCV RBC AUTO: 83.4 FL (ref 79–97)
MONOCYTES # BLD AUTO: 0.77 10*3/MM3 (ref 0.1–0.9)
MONOCYTES NFR BLD AUTO: 5.6 % (ref 5–12)
NEUTROPHILS NFR BLD AUTO: 10.68 10*3/MM3 (ref 1.7–7)
NEUTROPHILS NFR BLD AUTO: 77.8 % (ref 42.7–76)
NITRITE UR QL STRIP: NEGATIVE
NRBC BLD AUTO-RTO: 0 /100 WBC (ref 0–0.2)
NUMBER OF DOSES: NORMAL
PH UR STRIP.AUTO: 6 [PH] (ref 5–8)
PLATELET # BLD AUTO: 387 10*3/MM3 (ref 140–450)
PMV BLD AUTO: 10.1 FL (ref 6–12)
POTASSIUM SERPL-SCNC: 3.9 MMOL/L (ref 3.5–5.2)
PROT SERPL-MCNC: 7.4 G/DL (ref 6–8.5)
PROT UR QL STRIP: NEGATIVE
RBC # BLD AUTO: 4.89 10*6/MM3 (ref 3.77–5.28)
RBC # UR: ABNORMAL /HPF
REF LAB TEST METHOD: ABNORMAL
RH BLD: POSITIVE
SODIUM SERPL-SCNC: 141 MMOL/L (ref 136–145)
SP GR UR STRIP: 1.02 (ref 1–1.03)
SQUAMOUS #/AREA URNS HPF: ABNORMAL /HPF
UROBILINOGEN UR QL STRIP: ABNORMAL
WBC # BLD AUTO: 13.72 10*3/MM3 (ref 3.4–10.8)
WBC UR QL AUTO: ABNORMAL /HPF

## 2021-07-29 PROCEDURE — 85025 COMPLETE CBC W/AUTO DIFF WBC: CPT | Performed by: PHYSICIAN ASSISTANT

## 2021-07-29 PROCEDURE — 80053 COMPREHEN METABOLIC PANEL: CPT | Performed by: PHYSICIAN ASSISTANT

## 2021-07-29 PROCEDURE — 86901 BLOOD TYPING SEROLOGIC RH(D): CPT

## 2021-07-29 PROCEDURE — 86900 BLOOD TYPING SEROLOGIC ABO: CPT | Performed by: PHYSICIAN ASSISTANT

## 2021-07-29 PROCEDURE — 99283 EMERGENCY DEPT VISIT LOW MDM: CPT

## 2021-07-29 PROCEDURE — 84702 CHORIONIC GONADOTROPIN TEST: CPT | Performed by: PHYSICIAN ASSISTANT

## 2021-07-29 PROCEDURE — 86901 BLOOD TYPING SEROLOGIC RH(D): CPT | Performed by: PHYSICIAN ASSISTANT

## 2021-07-29 PROCEDURE — 76817 TRANSVAGINAL US OBSTETRIC: CPT | Performed by: RADIOLOGY

## 2021-07-29 PROCEDURE — 76817 TRANSVAGINAL US OBSTETRIC: CPT

## 2021-07-29 PROCEDURE — 86900 BLOOD TYPING SEROLOGIC ABO: CPT

## 2021-07-29 PROCEDURE — 81001 URINALYSIS AUTO W/SCOPE: CPT | Performed by: PHYSICIAN ASSISTANT

## 2021-07-29 PROCEDURE — 86850 RBC ANTIBODY SCREEN: CPT | Performed by: PHYSICIAN ASSISTANT

## 2021-07-29 RX ORDER — CEFDINIR 300 MG/1
300 CAPSULE ORAL 2 TIMES DAILY
Qty: 14 CAPSULE | Refills: 0 | Status: SHIPPED | OUTPATIENT
Start: 2021-07-29 | End: 2021-08-05

## 2021-07-31 ENCOUNTER — HOSPITAL ENCOUNTER (EMERGENCY)
Facility: HOSPITAL | Age: 26
Discharge: HOME | End: 2021-07-31
Attending: EMERGENCY MEDICINE | Admitting: EMERGENCY MEDICINE

## 2021-07-31 ENCOUNTER — APPOINTMENT (OUTPATIENT)
Dept: ULTRASOUND IMAGING | Facility: HOSPITAL | Age: 26
End: 2021-07-31

## 2021-07-31 VITALS
SYSTOLIC BLOOD PRESSURE: 151 MMHG | HEIGHT: 70 IN | OXYGEN SATURATION: 100 % | DIASTOLIC BLOOD PRESSURE: 109 MMHG | HEART RATE: 70 BPM | WEIGHT: 225 LBS | BODY MASS INDEX: 32.21 KG/M2 | TEMPERATURE: 98.4 F | RESPIRATION RATE: 24 BRPM

## 2021-07-31 DIAGNOSIS — O03.9 MISCARRIAGE: Primary | ICD-10-CM

## 2021-07-31 LAB
ALBUMIN SERPL-MCNC: 4.28 G/DL (ref 3.5–5.2)
ALBUMIN/GLOB SERPL: 1.3 G/DL
ALP SERPL-CCNC: 91 U/L (ref 39–117)
ALT SERPL W P-5'-P-CCNC: 17 U/L (ref 1–33)
ANION GAP SERPL CALCULATED.3IONS-SCNC: 15.4 MMOL/L (ref 5–15)
AST SERPL-CCNC: 18 U/L (ref 1–32)
BACTERIA UR QL AUTO: ABNORMAL /HPF
BASOPHILS # BLD AUTO: 0.05 10*3/MM3 (ref 0–0.2)
BASOPHILS NFR BLD AUTO: 0.5 % (ref 0–1.5)
BILIRUB SERPL-MCNC: 0.3 MG/DL (ref 0–1.2)
BILIRUB UR QL STRIP: NEGATIVE
BUN SERPL-MCNC: 9 MG/DL (ref 6–20)
BUN/CREAT SERPL: 11.3 (ref 7–25)
CALCIUM SPEC-SCNC: 9.3 MG/DL (ref 8.6–10.5)
CHLORIDE SERPL-SCNC: 105 MMOL/L (ref 98–107)
CLARITY UR: CLEAR
CO2 SERPL-SCNC: 20.6 MMOL/L (ref 22–29)
COLOR UR: ABNORMAL
CREAT SERPL-MCNC: 0.8 MG/DL (ref 0.57–1)
DEPRECATED RDW RBC AUTO: 43.1 FL (ref 37–54)
EOSINOPHIL # BLD AUTO: 0.07 10*3/MM3 (ref 0–0.4)
EOSINOPHIL NFR BLD AUTO: 0.7 % (ref 0.3–6.2)
ERYTHROCYTE [DISTWIDTH] IN BLOOD BY AUTOMATED COUNT: 14.3 % (ref 12.3–15.4)
GFR SERPL CREATININE-BSD FRML MDRD: 88 ML/MIN/1.73
GLOBULIN UR ELPH-MCNC: 3.3 GM/DL
GLUCOSE SERPL-MCNC: 120 MG/DL (ref 65–99)
GLUCOSE UR STRIP-MCNC: NEGATIVE MG/DL
HCG INTACT+B SERPL-ACNC: 14 MIU/ML
HCT VFR BLD AUTO: 41.1 % (ref 34–46.6)
HGB BLD-MCNC: 12.7 G/DL (ref 12–15.9)
HGB UR QL STRIP.AUTO: ABNORMAL
HYALINE CASTS UR QL AUTO: ABNORMAL /LPF
IMM GRANULOCYTES # BLD AUTO: 0.05 10*3/MM3 (ref 0–0.05)
IMM GRANULOCYTES NFR BLD AUTO: 0.5 % (ref 0–0.5)
KETONES UR QL STRIP: ABNORMAL
LEUKOCYTE ESTERASE UR QL STRIP.AUTO: ABNORMAL
LYMPHOCYTES # BLD AUTO: 1.45 10*3/MM3 (ref 0.7–3.1)
LYMPHOCYTES NFR BLD AUTO: 13.5 % (ref 19.6–45.3)
MCH RBC QN AUTO: 25.5 PG (ref 26.6–33)
MCHC RBC AUTO-ENTMCNC: 30.9 G/DL (ref 31.5–35.7)
MCV RBC AUTO: 82.5 FL (ref 79–97)
MONOCYTES # BLD AUTO: 0.49 10*3/MM3 (ref 0.1–0.9)
MONOCYTES NFR BLD AUTO: 4.6 % (ref 5–12)
NEUTROPHILS NFR BLD AUTO: 8.61 10*3/MM3 (ref 1.7–7)
NEUTROPHILS NFR BLD AUTO: 80.2 % (ref 42.7–76)
NITRITE UR QL STRIP: NEGATIVE
NRBC BLD AUTO-RTO: 0 /100 WBC (ref 0–0.2)
PH UR STRIP.AUTO: <=5 [PH] (ref 5–8)
PLATELET # BLD AUTO: 412 10*3/MM3 (ref 140–450)
PMV BLD AUTO: 10.2 FL (ref 6–12)
POTASSIUM SERPL-SCNC: 3.7 MMOL/L (ref 3.5–5.2)
PROT SERPL-MCNC: 7.6 G/DL (ref 6–8.5)
PROT UR QL STRIP: NEGATIVE
RBC # BLD AUTO: 4.98 10*6/MM3 (ref 3.77–5.28)
RBC # UR: ABNORMAL /HPF
REF LAB TEST METHOD: ABNORMAL
SODIUM SERPL-SCNC: 141 MMOL/L (ref 136–145)
SP GR UR STRIP: 1.03 (ref 1–1.03)
SQUAMOUS #/AREA URNS HPF: ABNORMAL /HPF
UROBILINOGEN UR QL STRIP: ABNORMAL
WBC # BLD AUTO: 10.72 10*3/MM3 (ref 3.4–10.8)
WBC UR QL AUTO: ABNORMAL /HPF

## 2021-07-31 PROCEDURE — 80053 COMPREHEN METABOLIC PANEL: CPT | Performed by: PHYSICIAN ASSISTANT

## 2021-07-31 PROCEDURE — 36415 COLL VENOUS BLD VENIPUNCTURE: CPT

## 2021-07-31 PROCEDURE — 76830 TRANSVAGINAL US NON-OB: CPT | Performed by: RADIOLOGY

## 2021-07-31 PROCEDURE — 76817 TRANSVAGINAL US OBSTETRIC: CPT

## 2021-07-31 PROCEDURE — 84702 CHORIONIC GONADOTROPIN TEST: CPT | Performed by: PHYSICIAN ASSISTANT

## 2021-07-31 PROCEDURE — 99283 EMERGENCY DEPT VISIT LOW MDM: CPT

## 2021-07-31 PROCEDURE — 85025 COMPLETE CBC W/AUTO DIFF WBC: CPT | Performed by: PHYSICIAN ASSISTANT

## 2021-07-31 PROCEDURE — 81001 URINALYSIS AUTO W/SCOPE: CPT | Performed by: PHYSICIAN ASSISTANT

## 2021-08-02 RX ORDER — LISINOPRIL 20 MG/1
TABLET ORAL
Qty: 30 TABLET | Refills: 5 | OUTPATIENT
Start: 2021-08-02

## 2022-11-15 NOTE — DISCHARGE INSTR - APPOINTMENTS
[facility]  [address]   [address]  [facility] [address]  [phone]  Appt: July 24th @ 10:30 With Mirtha Katz  
regular

## 2023-03-03 ENCOUNTER — HOSPITAL ENCOUNTER (EMERGENCY)
Facility: HOSPITAL | Age: 28
Discharge: HOME | End: 2023-03-03
Attending: EMERGENCY MEDICINE | Admitting: EMERGENCY MEDICINE
Payer: MEDICAID

## 2023-03-03 ENCOUNTER — APPOINTMENT (OUTPATIENT)
Dept: ULTRASOUND IMAGING | Facility: HOSPITAL | Age: 28
End: 2023-03-03
Payer: MEDICAID

## 2023-03-03 VITALS
RESPIRATION RATE: 16 BRPM | WEIGHT: 214 LBS | SYSTOLIC BLOOD PRESSURE: 144 MMHG | HEART RATE: 81 BPM | HEIGHT: 70 IN | OXYGEN SATURATION: 99 % | DIASTOLIC BLOOD PRESSURE: 92 MMHG | TEMPERATURE: 97.7 F | BODY MASS INDEX: 30.64 KG/M2

## 2023-03-03 DIAGNOSIS — N39.0 ACUTE UTI: ICD-10-CM

## 2023-03-03 DIAGNOSIS — F12.10 CANNABIS ABUSE: ICD-10-CM

## 2023-03-03 DIAGNOSIS — Z76.89 ENCOUNTER FOR ASSESSMENT FOR FETAL DEMISE: Primary | ICD-10-CM

## 2023-03-03 LAB
ABO GROUP BLD: NORMAL
ALBUMIN SERPL-MCNC: 4.1 G/DL (ref 3.5–5.2)
ALBUMIN/GLOB SERPL: 1.3 G/DL
ALP SERPL-CCNC: 66 U/L (ref 39–117)
ALT SERPL W P-5'-P-CCNC: 15 U/L (ref 1–33)
AMPHET+METHAMPHET UR QL: NEGATIVE
AMPHETAMINES UR QL: NEGATIVE
ANION GAP SERPL CALCULATED.3IONS-SCNC: 10.9 MMOL/L (ref 5–15)
AST SERPL-CCNC: 17 U/L (ref 1–32)
BACTERIA UR QL AUTO: ABNORMAL /HPF
BARBITURATES UR QL SCN: NEGATIVE
BASOPHILS # BLD AUTO: 0.06 10*3/MM3 (ref 0–0.2)
BASOPHILS NFR BLD AUTO: 0.4 % (ref 0–1.5)
BENZODIAZ UR QL SCN: NEGATIVE
BILIRUB SERPL-MCNC: 0.2 MG/DL (ref 0–1.2)
BILIRUB UR QL STRIP: NEGATIVE
BLD GP AB SCN SERPL QL: NEGATIVE
BUN SERPL-MCNC: 5 MG/DL (ref 6–20)
BUN/CREAT SERPL: 8.3 (ref 7–25)
BUPRENORPHINE SERPL-MCNC: NEGATIVE NG/ML
CALCIUM SPEC-SCNC: 9.6 MG/DL (ref 8.6–10.5)
CANNABINOIDS SERPL QL: POSITIVE
CHLORIDE SERPL-SCNC: 105 MMOL/L (ref 98–107)
CLARITY UR: ABNORMAL
CO2 SERPL-SCNC: 21.1 MMOL/L (ref 22–29)
COCAINE UR QL: NEGATIVE
COLOR UR: ABNORMAL
CREAT SERPL-MCNC: 0.6 MG/DL (ref 0.57–1)
DEPRECATED RDW RBC AUTO: 45.1 FL (ref 37–54)
EGFRCR SERPLBLD CKD-EPI 2021: 127.9 ML/MIN/1.73
EOSINOPHIL # BLD AUTO: 0.11 10*3/MM3 (ref 0–0.4)
EOSINOPHIL NFR BLD AUTO: 0.8 % (ref 0.3–6.2)
ERYTHROCYTE [DISTWIDTH] IN BLOOD BY AUTOMATED COUNT: 14.4 % (ref 12.3–15.4)
GLOBULIN UR ELPH-MCNC: 3.2 GM/DL
GLUCOSE SERPL-MCNC: 97 MG/DL (ref 65–99)
GLUCOSE UR STRIP-MCNC: NEGATIVE MG/DL
HCG INTACT+B SERPL-ACNC: 9718 MIU/ML
HCT VFR BLD AUTO: 40.5 % (ref 34–46.6)
HGB BLD-MCNC: 12.9 G/DL (ref 12–15.9)
HGB UR QL STRIP.AUTO: NEGATIVE
HOLD SPECIMEN: NORMAL
HOLD SPECIMEN: NORMAL
HYALINE CASTS UR QL AUTO: ABNORMAL /LPF
IMM GRANULOCYTES # BLD AUTO: 0.07 10*3/MM3 (ref 0–0.05)
IMM GRANULOCYTES NFR BLD AUTO: 0.5 % (ref 0–0.5)
KETONES UR QL STRIP: NEGATIVE
LEUKOCYTE ESTERASE UR QL STRIP.AUTO: ABNORMAL
LYMPHOCYTES # BLD AUTO: 1.96 10*3/MM3 (ref 0.7–3.1)
LYMPHOCYTES NFR BLD AUTO: 14.3 % (ref 19.6–45.3)
MCH RBC QN AUTO: 27.3 PG (ref 26.6–33)
MCHC RBC AUTO-ENTMCNC: 31.9 G/DL (ref 31.5–35.7)
MCV RBC AUTO: 85.6 FL (ref 79–97)
METHADONE UR QL SCN: NEGATIVE
MONOCYTES # BLD AUTO: 0.64 10*3/MM3 (ref 0.1–0.9)
MONOCYTES NFR BLD AUTO: 4.7 % (ref 5–12)
NEUTROPHILS NFR BLD AUTO: 10.84 10*3/MM3 (ref 1.7–7)
NEUTROPHILS NFR BLD AUTO: 79.3 % (ref 42.7–76)
NITRITE UR QL STRIP: NEGATIVE
NRBC BLD AUTO-RTO: 0 /100 WBC (ref 0–0.2)
NUMBER OF DOSES: NORMAL
OPIATES UR QL: NEGATIVE
OXYCODONE UR QL SCN: NEGATIVE
PCP UR QL SCN: NEGATIVE
PH UR STRIP.AUTO: 5.5 [PH] (ref 5–8)
PLATELET # BLD AUTO: 312 10*3/MM3 (ref 140–450)
PMV BLD AUTO: 10.5 FL (ref 6–12)
POTASSIUM SERPL-SCNC: 3.7 MMOL/L (ref 3.5–5.2)
PROPOXYPH UR QL: NEGATIVE
PROT SERPL-MCNC: 7.3 G/DL (ref 6–8.5)
PROT UR QL STRIP: NEGATIVE
QT INTERVAL: 360 MS
QTC INTERVAL: 435 MS
RBC # BLD AUTO: 4.73 10*6/MM3 (ref 3.77–5.28)
RBC # UR STRIP: ABNORMAL /HPF
REF LAB TEST METHOD: ABNORMAL
RH BLD: POSITIVE
SODIUM SERPL-SCNC: 137 MMOL/L (ref 136–145)
SP GR UR STRIP: 1.02 (ref 1–1.03)
SQUAMOUS #/AREA URNS HPF: ABNORMAL /HPF
TRICYCLICS UR QL SCN: NEGATIVE
TSH SERPL DL<=0.05 MIU/L-ACNC: 1.22 UIU/ML (ref 0.27–4.2)
UROBILINOGEN UR QL STRIP: ABNORMAL
WBC # UR STRIP: ABNORMAL /HPF
WBC NRBC COR # BLD: 13.68 10*3/MM3 (ref 3.4–10.8)
WHOLE BLOOD HOLD COAG: NORMAL
WHOLE BLOOD HOLD SPECIMEN: NORMAL

## 2023-03-03 PROCEDURE — 86850 RBC ANTIBODY SCREEN: CPT | Performed by: PHYSICIAN ASSISTANT

## 2023-03-03 PROCEDURE — 36415 COLL VENOUS BLD VENIPUNCTURE: CPT

## 2023-03-03 PROCEDURE — 86901 BLOOD TYPING SEROLOGIC RH(D): CPT | Performed by: PHYSICIAN ASSISTANT

## 2023-03-03 PROCEDURE — 93010 ELECTROCARDIOGRAM REPORT: CPT | Performed by: INTERNAL MEDICINE

## 2023-03-03 PROCEDURE — 76817 TRANSVAGINAL US OBSTETRIC: CPT | Performed by: RADIOLOGY

## 2023-03-03 PROCEDURE — 81001 URINALYSIS AUTO W/SCOPE: CPT | Performed by: PHYSICIAN ASSISTANT

## 2023-03-03 PROCEDURE — 93005 ELECTROCARDIOGRAM TRACING: CPT | Performed by: EMERGENCY MEDICINE

## 2023-03-03 PROCEDURE — 84702 CHORIONIC GONADOTROPIN TEST: CPT | Performed by: PHYSICIAN ASSISTANT

## 2023-03-03 PROCEDURE — 80053 COMPREHEN METABOLIC PANEL: CPT | Performed by: PHYSICIAN ASSISTANT

## 2023-03-03 PROCEDURE — 80306 DRUG TEST PRSMV INSTRMNT: CPT | Performed by: PHYSICIAN ASSISTANT

## 2023-03-03 PROCEDURE — 85025 COMPLETE CBC W/AUTO DIFF WBC: CPT | Performed by: PHYSICIAN ASSISTANT

## 2023-03-03 PROCEDURE — 76817 TRANSVAGINAL US OBSTETRIC: CPT

## 2023-03-03 PROCEDURE — 84443 ASSAY THYROID STIM HORMONE: CPT | Performed by: PHYSICIAN ASSISTANT

## 2023-03-03 PROCEDURE — 86900 BLOOD TYPING SEROLOGIC ABO: CPT | Performed by: PHYSICIAN ASSISTANT

## 2023-03-03 PROCEDURE — 99284 EMERGENCY DEPT VISIT MOD MDM: CPT

## 2023-03-03 RX ORDER — CEFDINIR 300 MG/1
300 CAPSULE ORAL 2 TIMES DAILY
Qty: 20 CAPSULE | Refills: 0 | Status: SHIPPED | OUTPATIENT
Start: 2023-03-03 | End: 2023-03-13

## 2023-03-03 RX ORDER — SODIUM CHLORIDE 0.9 % (FLUSH) 0.9 %
10 SYRINGE (ML) INJECTION AS NEEDED
Status: DISCONTINUED | OUTPATIENT
Start: 2023-03-03 | End: 2023-03-03 | Stop reason: HOSPADM

## 2023-03-03 NOTE — ED PROVIDER NOTES
Subjective   History of Present Illness  28 yo female patient with hx of GERD, HA, HTN, depression, and anxiety presents to the ED wanting a second opinion regarding her pregnancy. Patient states that Tuesday she had went to her OBGYN office for hypertension and pelvic pain. Patient states that she was also having CP. Patient states that she was told at the appointment they could not find a heart beat. Pt states that she is 10 weeks 6days pregnant. This is her second pregnancy. 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 Patient denies any vaginal bleeding and states she is no longer having pain.      History provided by:  Patient   used: No        Review of Systems   Constitutional: Negative.    HENT: Negative.    Eyes: Negative.    Respiratory: Negative.    Cardiovascular: Negative.    Gastrointestinal: Negative.    Endocrine: Negative.    Genitourinary: Positive for pelvic pain.   Musculoskeletal: Negative.    Skin: Negative.    Allergic/Immunologic: Negative.    Neurological: Negative.    Hematological: Negative.    Psychiatric/Behavioral: Negative.    All other systems reviewed and are negative.      Past Medical History:   Diagnosis Date   • Anxiety    • Depression    • GERD (gastroesophageal reflux disease)    • Headache    • Hypertension    • Obesity        No Known Allergies    Past Surgical History:   Procedure Laterality Date   • GALLBLADDER SURGERY  06/02/2020   • WISDOM TOOTH EXTRACTION  2014       Family History   Problem Relation Age of Onset   • Hypertension Mother    • Thyroid disease Mother    • Cancer Mother         breast   • No Known Problems Father         car wreck    • Breast cancer Paternal Grandmother    • Hypertension Paternal Grandmother    • Arthritis Paternal Grandmother    • Cancer Paternal Grandmother         Breast       Social History     Socioeconomic History   • Marital status: Single   Tobacco Use   • Smoking status: Every Day     Packs/day: 1.00      Years: 6.00     Pack years: 6.00     Types: Cigarettes   • Smokeless tobacco: Never   Substance and Sexual Activity   • Alcohol use: Yes     Comment: pt stated she has only been drinking for about a week and its 2-3 whitclaws daily.   • Drug use: No     Comment: Pt stated she has been smoking THC on and off for 2-3yrs. Last smoked yesterday.   • Sexual activity: Yes     Partners: Male     Birth control/protection: None           Objective   Physical Exam  Vitals and nursing note reviewed.   Constitutional:       Appearance: She is well-developed and normal weight.   HENT:      Head: Normocephalic and atraumatic.   Eyes:      Extraocular Movements: Extraocular movements intact.      Pupils: Pupils are equal, round, and reactive to light.   Cardiovascular:      Rate and Rhythm: Normal rate and regular rhythm.      Heart sounds: Normal heart sounds.   Pulmonary:      Effort: Pulmonary effort is normal.      Breath sounds: Normal breath sounds.   Abdominal:      General: Bowel sounds are normal.      Palpations: Abdomen is soft.   Musculoskeletal:         General: Normal range of motion.      Cervical back: Normal range of motion and neck supple.   Skin:     General: Skin is warm and dry.      Capillary Refill: Capillary refill takes less than 2 seconds.   Neurological:      General: No focal deficit present.      Mental Status: She is alert and oriented to person, place, and time.   Psychiatric:         Mood and Affect: Mood is anxious.      Comments: Pt is tearful. Patient states she just wants a second opinion on her pregnancy. States she has not been able to stand waiting at home until her next appt on Monday.           Procedures           ED Course  ED Course as of 03/03/23 1602   Fri Mar 03, 2023   1401 THC Screen, Urine(!): Positive [ML]   1419 US Ob Transvaginal  IMPRESSION:  1.  Intrauterine gestation with gestational sac measurement  corresponding to 8 weeks 6 day gestation and crown-rump  length  corresponding to 7 week 2 day gestation but without detectable heart  rate. This is concerning for early fetal demise.  2.  A multiloculated cyst with somewhat unusual appearing  circumferential internal cyst measuring about 7 mm. Ultrasound and beta  hCG follow-up for this cyst is recommended.     This report was finalized on 3/3/2023 2:13 PM by Dr. Carlos Aguirre MD.           Specimen Collected: 03/03/23 14:08 EST Last Resulted: 03/03/23 14:13 EST         [ML]   1425 D/W Dr. [facility]. She will see in office at 8:15 Monday morning. They will do another US to confirm in the office and plan to do D&C after that.  [ML]      ED Course User Index  [ML] Michelle Londono PA               US Ob Transvaginal    Result Date: 3/3/2023  1.  Intrauterine gestation with gestational sac measurement corresponding to 8 weeks 6 day gestation and crown-rump length corresponding to 7 week 2 day gestation but without detectable heart rate. This is concerning for early fetal demise. 2.  A multiloculated cyst with somewhat unusual appearing circumferential internal cyst measuring about 7 mm. Ultrasound and beta hCG follow-up for this cyst is recommended.  This report was finalized on 3/3/2023 2:13 PM by Dr. Carlos Aguirre MD.      Results for orders placed or performed during the hospital encounter of 03/03/23   Comprehensive Metabolic Panel    Specimen: Blood   Result Value Ref Range    Glucose 97 65 - 99 mg/dL    BUN 5 (L) 6 - 20 mg/dL    Creatinine 0.60 0.57 - 1.00 mg/dL    Sodium 137 136 - 145 mmol/L    Potassium 3.7 3.5 - 5.2 mmol/L    Chloride 105 98 - 107 mmol/L    CO2 21.1 (L) 22.0 - 29.0 mmol/L    Calcium 9.6 8.6 - 10.5 mg/dL    Total Protein 7.3 6.0 - 8.5 g/dL    Albumin 4.1 3.5 - 5.2 g/dL    ALT (SGPT) 15 1 - 33 U/L    AST (SGOT) 17 1 - 32 U/L    Alkaline Phosphatase 66 39 - 117 U/L    Total Bilirubin 0.2 0.0 - 1.2 mg/dL    Globulin 3.2 gm/dL    A/G Ratio 1.3 g/dL    BUN/Creatinine Ratio 8.3 7.0 - 25.0    Anion  Gap 10.9 5.0 - 15.0 mmol/L    eGFR 127.9 >60.0 mL/min/1.73   Urinalysis With Microscopic If Indicated (No Culture) - Urine, Clean Catch    Specimen: Urine, Clean Catch   Result Value Ref Range    Color, UA Dark Yellow (A) Yellow, Straw    Appearance, UA Cloudy (A) Clear    pH, UA 5.5 5.0 - 8.0    Specific Gravity, UA 1.017 1.005 - 1.030    Glucose, UA Negative Negative    Ketones, UA Negative Negative    Bilirubin, UA Negative Negative    Blood, UA Negative Negative    Protein, UA Negative Negative    Leuk Esterase, UA Trace (A) Negative    Nitrite, UA Negative Negative    Urobilinogen, UA 0.2 E.U./dL 0.2 - 1.0 E.U./dL   hCG, Quantitative, Pregnancy    Specimen: Blood   Result Value Ref Range    HCG Quantitative 9,718.00 mIU/mL   TSH    Specimen: Blood   Result Value Ref Range    TSH 1.220 0.270 - 4.200 uIU/mL   Urine Drug Screen - Urine, Clean Catch    Specimen: Urine, Clean Catch   Result Value Ref Range    THC, Screen, Urine Positive (A) Negative    Phencyclidine (PCP), Urine Negative Negative    Cocaine Screen, Urine Negative Negative    Methamphetamine, Ur Negative Negative    Opiate Screen Negative Negative    Amphetamine Screen, Urine Negative Negative    Benzodiazepine Screen, Urine Negative Negative    Tricyclic Antidepressants Screen Negative Negative    Methadone Screen, Urine Negative Negative    Barbiturates Screen, Urine Negative Negative    Oxycodone Screen, Urine Negative Negative    Propoxyphene Screen Negative Negative    Buprenorphine, Screen, Urine Negative Negative   CBC Auto Differential    Specimen: Blood   Result Value Ref Range    WBC 13.68 (H) 3.40 - 10.80 10*3/mm3    RBC 4.73 3.77 - 5.28 10*6/mm3    Hemoglobin 12.9 12.0 - 15.9 g/dL    Hematocrit 40.5 34.0 - 46.6 %    MCV 85.6 79.0 - 97.0 fL    MCH 27.3 26.6 - 33.0 pg    MCHC 31.9 31.5 - 35.7 g/dL    RDW 14.4 12.3 - 15.4 %    RDW-SD 45.1 37.0 - 54.0 fl    MPV 10.5 6.0 - 12.0 fL    Platelets 312 140 - 450 10*3/mm3    Neutrophil % 79.3 (H)  42.7 - 76.0 %    Lymphocyte % 14.3 (L) 19.6 - 45.3 %    Monocyte % 4.7 (L) 5.0 - 12.0 %    Eosinophil % 0.8 0.3 - 6.2 %    Basophil % 0.4 0.0 - 1.5 %    Immature Grans % 0.5 0.0 - 0.5 %    Neutrophils, Absolute 10.84 (H) 1.70 - 7.00 10*3/mm3    Lymphocytes, Absolute 1.96 0.70 - 3.10 10*3/mm3    Monocytes, Absolute 0.64 0.10 - 0.90 10*3/mm3    Eosinophils, Absolute 0.11 0.00 - 0.40 10*3/mm3    Basophils, Absolute 0.06 0.00 - 0.20 10*3/mm3    Immature Grans, Absolute 0.07 (H) 0.00 - 0.05 10*3/mm3    nRBC 0.0 0.0 - 0.2 /100 WBC   Urinalysis, Microscopic Only - Urine, Clean Catch    Specimen: Urine, Clean Catch   Result Value Ref Range    RBC, UA 3-5 (A) None Seen, 0-2 /HPF    WBC, UA 6-12 (A) None Seen, 0-2 /HPF    Bacteria, UA 2+ (A) None Seen /HPF    Squamous Epithelial Cells, UA 3-6 (A) None Seen, 0-2 /HPF    Hyaline Casts, UA 3-6 None Seen /LPF    Methodology Manual Light Microscopy    ECG 12 Lead Chest Pain   Result Value Ref Range    QT Interval 360 ms    QTC Interval 435 ms    RhIg Evaluation    Specimen: Blood   Result Value Ref Range    ABO Type O     RH type Positive     Antibody Screen Negative    Doses of Rh Immune Globulin    Specimen: Blood   Result Value Ref Range    Number of Doses       RhIg is not indicated due to the patient's Rh status   Green Top (Gel)   Result Value Ref Range    Extra Tube Hold for add-ons.    Lavender Top   Result Value Ref Range    Extra Tube hold for add-on    Gold Top - SST   Result Value Ref Range    Extra Tube Hold for add-ons.    Light Blue Top   Result Value Ref Range    Extra Tube Hold for add-ons.                                   Medical Decision Making  28 yo female patient with hx of GERD, HA, HTN, depression, and anxiety presents to the ED wanting a second opinion regarding her pregnancy. Patient states that Tuesday she had went to her OBGYN office for hypertension and pelvic pain. Patient states that she was also having CP. Patient states that she was told  at the appointment they could not find a heart beat. Pt states that she is 10 weeks 6days pregnant. This is her second pregnancy. 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 Patient denies any vaginal bleeding and states she is no longer having pain.  Pt is tearful and anxious. Patient states she just wants a second opinion on her pregnancy. States she has not been able to stand waiting at home until her next appt on Monday. Imaging concerning for fetal demise. I spoke with . She will see Monday after repeat US plans for D&C.     Acute UTI: complicated acute illness or injury  Cannabis abuse: complicated acute illness or injury  Encounter for assessment for fetal demise: complicated acute illness or injury  Amount and/or Complexity of Data Reviewed  Labs: ordered. Decision-making details documented in ED Course.  Radiology: ordered. Decision-making details documented in ED Course.  ECG/medicine tests: ordered.      Risk  Prescription drug management.          Final diagnoses:   Encounter for assessment for fetal demise   Cannabis abuse   Acute UTI       ED Disposition  ED Disposition     ED Disposition   Discharge    Condition   Stable    Comment   --             Angi Molina, APRN  [address] [facility] [id number] [phone]    Schedule an appointment as soon as possible for a visit in 3 days      Krystina [facility] DO  [address] [facility]  [address]  [place] [id number] [phone]    Go in 3 days  8:15AM         Medication List      New Prescriptions    cefdinir 300 MG capsule  Commonly known as: OMNICEF  Take 1 capsule by mouth 2 (Two) Times a Day for 10 days.           Where to Get Your Medications      These medications were sent to [facility] [address] - [phone] [place] [id number] [address] [facility] [address]    Phone: [phone]   · cefdinir 300 MG capsule          Michelle Londono PA  03/03/23 0138

## 2023-03-03 NOTE — DISCHARGE INSTRUCTIONS
Dr. [facility] will see you at 8:15AM Monday in the office. She will do another ultrasound in the office to confirm.  She plans to do a D&C that day as well depending on ultrasound results.  She recommends you remain NPO (nothing to eat or drink) after Sunday midnight.  Return to the ED if you worsen or develop new symptoms.

## 2023-06-23 PROBLEM — N20.0 RENAL CALCULUS: Status: ACTIVE | Noted: 2023-06-23

## 2023-07-12 PROBLEM — N20.1 RIGHT URETERAL STONE: Status: ACTIVE | Noted: 2023-07-12

## 2023-07-12 NOTE — H&P (VIEW-ONLY)
"Chief Complaint:    Chief Complaint   Patient presents with    kidney stone        Vital Signs:   /91 (BP Location: Left arm, Patient Position: Sitting, Cuff Size: Adult)   Pulse 61   Ht 177.8 cm (70\")   Wt 101 kg (222 lb)   BMI 31.85 kg/m²   Body mass index is 31.85 kg/m².      HPI:  Sandy Stephenson is a 28 y.o. female who presents today for  ER follow-up    History of Present Illness  This patient presents to the clinic for an ER follow-up.  She was initially seen in office by Dr. Clarke for a right 8 mm intrarenal calculi.  She underwent a right extracortical shockwave lithotripsy on 6/30/2022 and was discharged home.  Patient reports that she had significant nausea and vomiting as well as abdominal pain over the past 2 days and proceeded to go to the ER yesterday for evaluation.  She had a CT scan completed at that time that revealed a 5 mm right distal ureteral calculus causing some moderate hydronephrosis/hydroureter.  She was discharged home with cefdinir, Zofran, and Toradol.  She states pain is controlled at this time however she still experiences significant frequency, urgency, flank pain, back pain, abdominal pain, nausea, and pelvic pain.  She does report passing small calculi postop.  She is desirous to undergo surgery for stone removal.    Past Medical History:  Past Medical History:   Diagnosis Date    Anxiety     Depression     GERD (gastroesophageal reflux disease)     Headache     Hypertension     Kidney stone     Obesity      Patient is current everyday smoker.  Never used smokeless tobacco.  Counseling given however not ready to quit at this time.  Current Meds:  Current Outpatient Medications   Medication Sig Dispense Refill    cefdinir (OMNICEF) 300 MG capsule Take 1 capsule by mouth.      escitalopram (LEXAPRO) 20 MG tablet Take 1 tablet by mouth Daily. 30 tablet 5    fluticasone (Flonase) 50 MCG/ACT nasal spray 2 sprays into the nostril(s) as directed by provider Daily. 16 g 11 "    ketorolac (TORADOL) 10 MG tablet Take 1 tablet by mouth.      lisinopril (PRINIVIL,ZESTRIL) 10 MG tablet Take 1 tablet by mouth Daily. for blood pressure.      loratadine (CLARITIN) 10 MG tablet TAKE 1 TABLET BY MOUTH EVERY DAY FOR ALLERGIES 30 tablet 5    mirtazapine (REMERON) 30 MG tablet Take 1 tablet by mouth Every Night.      NIFEdipine XL (PROCARDIA XL) 60 MG 24 hr tablet Take 1 tablet by mouth Daily.      omeprazole (priLOSEC) 40 MG capsule TAKE 1 CAPSULE BY MOUTH EVERY DAY FOR THE STOMACH 30 capsule 2    ondansetron ODT (ZOFRAN-ODT) 4 MG disintegrating tablet Take 1 tablet by mouth.      oxyCODONE-acetaminophen (Percocet)  MG per tablet Take 1 tablet by mouth Every 8 (Eight) Hours As Needed for Severe Pain. 20 tablet 0    oxyCODONE-acetaminophen (Percocet)  MG per tablet Take 1 tablet by mouth Every 6 (Six) Hours As Needed for Moderate Pain (Pain). 12 tablet 0    propranolol (INDERAL) 10 MG tablet Take 1 tablet by mouth 2 (Two) Times a Day. 60 tablet 2    tamsulosin (FLOMAX) 0.4 MG capsule 24 hr capsule take 1 capsule by mouth once daily for 30 days       No current facility-administered medications for this visit.        Allergies:   No Known Allergies     Past Surgical History:  Past Surgical History:   Procedure Laterality Date    CYSTECTOMY      chest wall    DILATATION AND CURETTAGE      EXTRACORPOREAL SHOCK WAVE LITHOTRIPSY (ESWL) Right 6/30/2023    Procedure: EXTRACORPOREAL SHOCKWAVE LITHOTRIPSY;  Surgeon: Demond Clarke MD;  Location: [facility];  Service: Urology;  Laterality: Right;    GALLBLADDER SURGERY  06/02/2020    WISDOM TOOTH EXTRACTION  2014       Social History:  Social History     Socioeconomic History    Marital status: Single   Tobacco Use    Smoking status: Every Day     Packs/day: 0.50     Years: 6.00     Pack years: 3.00     Types: Cigarettes    Smokeless tobacco: Never   Vaping Use    Vaping Use: Some days    Substances: Nicotine, Flavoring    Devices:  Disposable   Substance and Sexual Activity    Alcohol use: Yes     Comment: pt stated she has only been drinking for about a week and its 2-3 whitclaws daily.    Drug use: Yes     Types: Marijuana     Comment: Pt stated she has been smoking THC on and off for 2-3yrs. Last smoked yesterday.    Sexual activity: Defer     Partners: Male     Birth control/protection: None       Family History:  Family History   Problem Relation Age of Onset    Hypertension Mother     Thyroid disease Mother     Cancer Mother         breast    No Known Problems Father         car wreck     Breast cancer Paternal Grandmother     Hypertension Paternal Grandmother     Arthritis Paternal Grandmother     Cancer Paternal Grandmother         Breast       Review of Systems:  Review of Systems   Constitutional:  Negative for activity change, appetite change, chills, diaphoresis, fatigue, fever and unexpected weight change.   HENT:  Negative for congestion, dental problem, drooling, ear discharge, ear pain, facial swelling, hearing loss, mouth sores, nosebleeds, postnasal drip, rhinorrhea, sinus pressure, sneezing, sore throat, tinnitus, trouble swallowing and voice change.    Eyes:  Negative for photophobia, pain, discharge, redness, itching and visual disturbance.   Respiratory:  Negative for apnea, cough, choking, chest tightness, shortness of breath, wheezing and stridor.    Cardiovascular:  Negative for chest pain, palpitations and leg swelling.   Gastrointestinal:  Positive for abdominal pain. Negative for abdominal distention, anal bleeding, blood in stool, constipation, diarrhea, nausea, rectal pain and vomiting.   Endocrine: Negative for cold intolerance, heat intolerance, polydipsia, polyphagia and polyuria.   Genitourinary:  Positive for flank pain, frequency, pelvic pain and urgency. Negative for difficulty urinating and dysuria.   Musculoskeletal:  Positive for back pain. Negative for arthralgias, gait problem, joint swelling,  myalgias, neck pain and neck stiffness.   Skin:  Negative for color change, pallor, rash and wound.   Allergic/Immunologic: Negative for environmental allergies, food allergies and immunocompromised state.   Neurological:  Negative for dizziness, tremors, seizures, syncope, facial asymmetry, speech difficulty, weakness, light-headedness, numbness and headaches.   Hematological:  Negative for adenopathy. Does not bruise/bleed easily.   Psychiatric/Behavioral:  Negative for agitation, behavioral problems, confusion, decreased concentration, dysphoric mood, hallucinations, self-injury, sleep disturbance and suicidal ideas. The patient is not nervous/anxious and is not hyperactive.      Physical Exam:  Physical Exam  Constitutional:       General: She is not in acute distress.     Appearance: Normal appearance.   HENT:      Head: Normocephalic and atraumatic.      Nose: Nose normal.      Mouth/Throat:      Mouth: Mucous membranes are moist.   Eyes:      Conjunctiva/sclera: Conjunctivae normal.   Cardiovascular:      Rate and Rhythm: Normal rate and regular rhythm.      Pulses: Normal pulses.      Heart sounds: Normal heart sounds.   Pulmonary:      Effort: Pulmonary effort is normal.      Breath sounds: Normal breath sounds.   Abdominal:      General: Bowel sounds are normal.      Palpations: Abdomen is soft.      Tenderness: There is no right CVA tenderness or left CVA tenderness.   Musculoskeletal:         General: Normal range of motion.      Cervical back: Normal range of motion.   Skin:     General: Skin is warm.   Neurological:      General: No focal deficit present.      Mental Status: She is alert and oriented to person, place, and time.   Psychiatric:         Mood and Affect: Mood normal.         Behavior: Behavior normal.         Thought Content: Thought content normal.         Judgment: Judgment normal.         Recent Image (CT and/or KUB):   CT Abdomen and Pelvis: No results found for this or any previous  visit.     CT Stone Protocol: No results found for this or any previous visit.     KUB: Results for orders placed during the hospital encounter of 02/24/21    XR Abdomen KUB    Narrative  XR ABDOMEN KUB-    REASON FOR EXAM:  LUQ abdominal pain; R10.12-Left upper quadrant pain    COMPARISON: None      The bowel gas pattern in the abdomen is unremarkable. No soft tissue  masses or pathological calcifications are demonstrated. The bony  structures are appropriate for age.    Impression  Nonspecific supine view abdomen    This report was finalized on 2/25/2021 8:08 AM by Dr. Irving James II, MD.       Labs:  Brief Urine Lab Results  (Last result in the past 365 days)        Color   Clarity   Blood   Leuk Est   Nitrite   Protein   CREAT   Urine HCG        07/11/23 1824               Negative             Admission on 06/30/2023, Discharged on 06/30/2023   Component Date Value Ref Range Status    HCG, Urine, QL 06/30/2023 Negative  Negative Final    Lot Number 06/30/2023 #[id number]   Final    Internal Positive Control 06/30/2023 Positive  Positive, Passed Final    Internal Negative Control 06/30/2023 Negative  Negative, Passed Final    Expiration Date 06/30/2023 7/27/24   Final   Pre-Admission Testing on 06/28/2023   Component Date Value Ref Range Status    WBC 06/28/2023 10.26  3.40 - 10.80 10*3/mm3 Final    RBC 06/28/2023 4.73  3.77 - 5.28 10*6/mm3 Final    Hemoglobin 06/28/2023 12.3  12.0 - 15.9 g/dL Final    Hematocrit 06/28/2023 39.8  34.0 - 46.6 % Final    MCV 06/28/2023 84.1  79.0 - 97.0 fL Final    MCH 06/28/2023 26.0 (L)  26.6 - 33.0 pg Final    MCHC 06/28/2023 30.9 (L)  31.5 - 35.7 g/dL Final    RDW 06/28/2023 14.2  12.3 - 15.4 % Final    RDW-SD 06/28/2023 43.7  37.0 - 54.0 fl Final    MPV 06/28/2023 11.2  6.0 - 12.0 fL Final    Platelets 06/28/2023 316  140 - 450 10*3/mm3 Final    Glucose 06/28/2023 106 (H)  65 - 99 mg/dL Final    BUN 06/28/2023 10  6 - 20 mg/dL Final    Creatinine 06/28/2023 0.73  0.57 -  1.00 mg/dL Final    Sodium 06/28/2023 138  136 - 145 mmol/L Final    Potassium 06/28/2023 3.6  3.5 - 5.2 mmol/L Final    Chloride 06/28/2023 105  98 - 107 mmol/L Final    CO2 06/28/2023 19.6 (L)  22.0 - 29.0 mmol/L Final    Calcium 06/28/2023 9.0  8.6 - 10.5 mg/dL Final    BUN/Creatinine Ratio 06/28/2023 13.7  7.0 - 25.0 Final    Anion Gap 06/28/2023 13.4  5.0 - 15.0 mmol/L Final    eGFR 06/28/2023 116.5  >60.0 mL/min/1.73 Final        Procedure: None  Procedures     I have reviewed and agree with the above PMH, PSH, FMH, social history, medications, allergies, and labs.     Assessment/Plan:   Problem List Items Addressed This Visit          Genitourinary and Reproductive     Right ureteral stone - Primary       Health Maintenance:   Health Maintenance Due   Topic Date Due    COVID-19 Vaccine (1) Never done    Pneumococcal Vaccine 0-64 (1 - PCV) Never done    HPV VACCINES (1 - 2-dose series) Never done    TDAP/TD VACCINES (1 - Tdap) Never done    HEPATITIS C SCREENING  Never done    ANNUAL PHYSICAL  Never done    PAP SMEAR  Never done        Smoking Counseling: Patient is current everyday smoker.  Never used smokeless tobacco.  Counseling given however not ready to quit at this time.    Urine Incontinence: Patient reports that she is not currently experiencing any symptoms of urinary incontinence.    Patient was given instructions and counseling regarding her condition or for health maintenance advice. Please see specific information pulled into the AVS if appropriate.    Patient Education:   Ureteral calculus- It was discussed with the patient the presence of a right distal ureteral 5 mm stone causing some obstructive uropathy. We discussed the various therapeutic options available including percutaneous nephrostolithotomy, ureteroscopy and extracorporeal shockwave  lithotripsy.  We discussed the risks of lithotripsy including the passage of stones leading to a 3% chance of Steinstrasse or a large string of stones  in the distal ureter. In this incidence the patient was informed that a ureteroscopy is indicated for obstructing fragments.  Patient was informed of an extremely rare incidence of renal hematoma and the significance of this.  Patient was educated on percutaneous nephrostolithotomy and its use as well as the risks and benefits such as the need for postoperative hospitalization, and the risk of damage to the kidney and the remote risk of a nephrectomy.  We also discussed the use of ureteroscopy in the upper tracts and its decreased success rate to completely remove the stones likely causing stent placement leading to an additional procedure for removal.  We discussed the absolute relative indicators for intervention including the presence of sepsis and uncontrollable pain leading to need for urgent intervention.  We discussed placement of a stent if indicated and the management of the stent as well.  Patient is roughly 2 weeks postop from extracorporal shockwave lithotripsy with stone fragments present in the distal ureter.  Advised patient she will most likely pass these voluntarily.  She is desirous to undergo surgery for stone removal.  We will get her scheduled for right uteroscopy with laser lithotripsy this Friday.  Advised patient to remain n.p.o. after midnight.  Patient verbalized understanding and agreed to plan of care.    Visit Diagnoses:    ICD-10-CM ICD-9-CM   1. Right ureteral stone  N20.1 592.1       Meds Ordered During Visit:  No orders of the defined types were placed in this encounter.      Follow Up Appointment: Right ureteroscopy with laser lithotripsy this Friday  No follow-ups on file.      This document has been electronically signed by Jaspreet Chávez PA-C   July 12, 2023 09:17 EDT    Part of this note may be an electronic transcription/translation of spoken language to printed text using the Dragon Dictation System.

## 2023-07-14 ENCOUNTER — APPOINTMENT (OUTPATIENT)
Dept: GENERAL RADIOLOGY | Facility: HOSPITAL | Age: 28
End: 2023-07-14
Payer: MEDICAID

## 2023-07-14 ENCOUNTER — HOSPITAL ENCOUNTER (OUTPATIENT)
Facility: HOSPITAL | Age: 28
Setting detail: HOSPITAL OUTPATIENT SURGERY
Discharge: HOME | End: 2023-07-14
Attending: UROLOGY | Admitting: UROLOGY
Payer: MEDICAID

## 2023-07-14 VITALS
SYSTOLIC BLOOD PRESSURE: 132 MMHG | WEIGHT: 214 LBS | HEART RATE: 64 BPM | OXYGEN SATURATION: 98 % | RESPIRATION RATE: 18 BRPM | HEIGHT: 70 IN | BODY MASS INDEX: 30.64 KG/M2 | DIASTOLIC BLOOD PRESSURE: 79 MMHG | TEMPERATURE: 97.9 F

## 2023-07-14 DIAGNOSIS — N20.1 RIGHT URETERAL STONE: ICD-10-CM

## 2023-07-14 LAB
B-HCG UR QL: NEGATIVE
EXPIRATION DATE: NORMAL
INTERNAL NEGATIVE CONTROL: NEGATIVE
INTERNAL POSITIVE CONTROL: POSITIVE
Lab: NORMAL

## 2023-07-14 PROCEDURE — 25510000001 IOPAMIDOL 61 % SOLUTION: Performed by: UROLOGY

## 2023-07-14 PROCEDURE — 82365 CALCULUS SPECTROSCOPY: CPT | Performed by: UROLOGY

## 2023-07-14 PROCEDURE — 81025 URINE PREGNANCY TEST: CPT | Performed by: ANESTHESIOLOGY

## 2023-07-14 PROCEDURE — 52353 CYSTOURETERO W/LITHOTRIPSY: CPT | Performed by: UROLOGY

## 2023-07-14 PROCEDURE — 25010000002 KETOROLAC TROMETHAMINE PER 15 MG: Performed by: NURSE ANESTHETIST, CERTIFIED REGISTERED

## 2023-07-14 PROCEDURE — 25010000002 FENTANYL CITRATE (PF) 50 MCG/ML SOLUTION: Performed by: NURSE ANESTHETIST, CERTIFIED REGISTERED

## 2023-07-14 RX ORDER — MIDAZOLAM HYDROCHLORIDE 1 MG/ML
1 INJECTION INTRAMUSCULAR; INTRAVENOUS
Status: DISCONTINUED | OUTPATIENT
Start: 2023-07-14 | End: 2023-07-14 | Stop reason: HOSPADM

## 2023-07-14 RX ORDER — SODIUM CHLORIDE 0.9 % (FLUSH) 0.9 %
10 SYRINGE (ML) INJECTION EVERY 12 HOURS SCHEDULED
Status: DISCONTINUED | OUTPATIENT
Start: 2023-07-14 | End: 2023-07-14 | Stop reason: HOSPADM

## 2023-07-14 RX ORDER — SODIUM CHLORIDE 9 MG/ML
40 INJECTION, SOLUTION INTRAVENOUS AS NEEDED
Status: DISCONTINUED | OUTPATIENT
Start: 2023-07-14 | End: 2023-07-14 | Stop reason: HOSPADM

## 2023-07-14 RX ORDER — SODIUM CHLORIDE 0.9 % (FLUSH) 0.9 %
10 SYRINGE (ML) INJECTION AS NEEDED
Status: DISCONTINUED | OUTPATIENT
Start: 2023-07-14 | End: 2023-07-14 | Stop reason: HOSPADM

## 2023-07-14 RX ORDER — OXYCODONE AND ACETAMINOPHEN 10; 325 MG/1; MG/1
1 TABLET ORAL EVERY 6 HOURS PRN
Qty: 12 TABLET | Refills: 0 | Status: SHIPPED | OUTPATIENT
Start: 2023-07-14

## 2023-07-14 RX ORDER — KETOROLAC TROMETHAMINE 30 MG/ML
30 INJECTION, SOLUTION INTRAMUSCULAR; INTRAVENOUS EVERY 6 HOURS PRN
Status: COMPLETED | OUTPATIENT
Start: 2023-07-14 | End: 2023-07-14

## 2023-07-14 RX ORDER — FENTANYL CITRATE 50 UG/ML
50 INJECTION, SOLUTION INTRAMUSCULAR; INTRAVENOUS
Status: DISCONTINUED | OUTPATIENT
Start: 2023-07-14 | End: 2023-07-14 | Stop reason: HOSPADM

## 2023-07-14 RX ORDER — IPRATROPIUM BROMIDE AND ALBUTEROL SULFATE 2.5; .5 MG/3ML; MG/3ML
3 SOLUTION RESPIRATORY (INHALATION) ONCE AS NEEDED
Status: DISCONTINUED | OUTPATIENT
Start: 2023-07-14 | End: 2023-07-14 | Stop reason: HOSPADM

## 2023-07-14 RX ORDER — SODIUM CHLORIDE, SODIUM LACTATE, POTASSIUM CHLORIDE, CALCIUM CHLORIDE 600; 310; 30; 20 MG/100ML; MG/100ML; MG/100ML; MG/100ML
125 INJECTION, SOLUTION INTRAVENOUS ONCE
Status: COMPLETED | OUTPATIENT
Start: 2023-07-14 | End: 2023-07-14

## 2023-07-14 RX ORDER — MEPERIDINE HYDROCHLORIDE 25 MG/ML
12.5 INJECTION INTRAMUSCULAR; INTRAVENOUS; SUBCUTANEOUS
Status: DISCONTINUED | OUTPATIENT
Start: 2023-07-14 | End: 2023-07-14 | Stop reason: HOSPADM

## 2023-07-14 RX ORDER — MAGNESIUM HYDROXIDE 1200 MG/15ML
LIQUID ORAL AS NEEDED
Status: DISCONTINUED | OUTPATIENT
Start: 2023-07-14 | End: 2023-07-14 | Stop reason: HOSPADM

## 2023-07-14 RX ORDER — GENTAMICIN SULFATE 80 MG/100ML
80 INJECTION, SOLUTION INTRAVENOUS ONCE
Status: COMPLETED | OUTPATIENT
Start: 2023-07-14 | End: 2023-07-14

## 2023-07-14 RX ORDER — ONDANSETRON 2 MG/ML
4 INJECTION INTRAMUSCULAR; INTRAVENOUS AS NEEDED
Status: DISCONTINUED | OUTPATIENT
Start: 2023-07-14 | End: 2023-07-14 | Stop reason: HOSPADM

## 2023-07-14 RX ORDER — LIDOCAINE HYDROCHLORIDE 20 MG/ML
JELLY TOPICAL AS NEEDED
Status: DISCONTINUED | OUTPATIENT
Start: 2023-07-14 | End: 2023-07-14 | Stop reason: HOSPADM

## 2023-07-14 RX ORDER — SODIUM CHLORIDE, SODIUM LACTATE, POTASSIUM CHLORIDE, CALCIUM CHLORIDE 600; 310; 30; 20 MG/100ML; MG/100ML; MG/100ML; MG/100ML
100 INJECTION, SOLUTION INTRAVENOUS ONCE AS NEEDED
Status: DISCONTINUED | OUTPATIENT
Start: 2023-07-14 | End: 2023-07-14 | Stop reason: HOSPADM

## 2023-07-14 RX ORDER — OXYCODONE HYDROCHLORIDE AND ACETAMINOPHEN 5; 325 MG/1; MG/1
1 TABLET ORAL ONCE AS NEEDED
Status: DISCONTINUED | OUTPATIENT
Start: 2023-07-14 | End: 2023-07-14 | Stop reason: HOSPADM

## 2023-07-14 RX ADMIN — KETOROLAC TROMETHAMINE 30 MG: 30 INJECTION, SOLUTION INTRAMUSCULAR; INTRAVENOUS at 13:26

## 2023-07-14 RX ADMIN — FENTANYL CITRATE 50 MCG: 50 INJECTION, SOLUTION INTRAMUSCULAR; INTRAVENOUS at 13:36

## 2023-07-14 RX ADMIN — SODIUM CHLORIDE, POTASSIUM CHLORIDE, SODIUM LACTATE AND CALCIUM CHLORIDE 125 ML/HR: 600; 310; 30; 20 INJECTION, SOLUTION INTRAVENOUS at 12:24

## 2023-07-14 NOTE — OP NOTE
CYSTOSCOPY URETEROSCOPY RETROGRADE PYELOGRAM HOLMIUM LASER STENT INSERTION  Procedure Note    Sandy Stephenson  7/14/2023    Pre-op Diagnosis:   Right ureteral stone [N20.1]    Post-op Diagnosis:     Post-Op Diagnosis Codes:     * Right ureteral stone [N20.1]    Procedure/CPT® Codes:  8-year-old white female with a right distal stone after ureteroscopy she is not passing and she now presents for lithotripsy following an informed consent brought the procedure suite prepped and draped in a low dorsolithotomy position I used the Chatterjee 4.5 [place] ureteroscope advanced up the right orifice identified the stone broke in several pieces with the laser and extracted each piece retrograde showed no extravasation no complications were encountered  Procedure(s):  CYSTOSCOPY URETEROSCOPY RETROGRADE PYELOGRAM HOLMIUM LASER    Surgeon(s):  Demond Clarke MD    Anesthesia: see anesthesia record    Staff:   Circulator: Chiquis Albarran RN  Scrub Person: Paloma Louie; Orin Albarran    Estimated Blood Loss: none  Urine Voided: * No values recorded between 7/14/2023 12:45 PM and 7/14/2023  1:10 PM *    Specimens:                ID Type Source Tests Collected by Time   1 :  Calculus Kidney, Right STONE ANALYSIS Demond Clarke MD 7/14/2023 1305         Drains: None    Findings: Excellent result    Blood: N/A    Complications: None    Grafts and Implants: None    Demond Clarke MD     Date: 7/14/2023  Time: 14:03 EDT

## 2023-07-21 LAB
CALCIUM OXALATE DIHYDRATE MFR STONE IR: 20 %
COLOR STONE: NORMAL
COM MFR STONE: 80 %
COMPN STONE: NORMAL
LABORATORY COMMENT REPORT: NORMAL
LABORATORY COMMENT REPORT: NORMAL
Lab: NORMAL
Lab: NORMAL
PHOTO: NORMAL
SIZE STONE: NORMAL MM
SPEC SOURCE SUBJ: NORMAL
WT STONE: 59 MG

## 2023-10-17 ENCOUNTER — APPOINTMENT (OUTPATIENT)
Dept: GENERAL RADIOLOGY | Facility: HOSPITAL | Age: 28
End: 2023-10-17
Payer: MEDICAID

## 2023-10-17 ENCOUNTER — HOSPITAL ENCOUNTER (EMERGENCY)
Facility: HOSPITAL | Age: 28
Discharge: HOME | End: 2023-10-17
Attending: STUDENT IN AN ORGANIZED HEALTH CARE EDUCATION/TRAINING PROGRAM | Admitting: STUDENT IN AN ORGANIZED HEALTH CARE EDUCATION/TRAINING PROGRAM
Payer: MEDICAID

## 2023-10-17 VITALS
TEMPERATURE: 98.6 F | OXYGEN SATURATION: 97 % | RESPIRATION RATE: 20 BRPM | WEIGHT: 215 LBS | BODY MASS INDEX: 30.78 KG/M2 | SYSTOLIC BLOOD PRESSURE: 159 MMHG | DIASTOLIC BLOOD PRESSURE: 91 MMHG | HEIGHT: 70 IN | HEART RATE: 98 BPM

## 2023-10-17 DIAGNOSIS — S93.402A SPRAIN OF LEFT ANKLE, UNSPECIFIED LIGAMENT, INITIAL ENCOUNTER: Primary | ICD-10-CM

## 2023-10-17 PROCEDURE — 73610 X-RAY EXAM OF ANKLE: CPT

## 2023-10-17 PROCEDURE — 73620 X-RAY EXAM OF FOOT: CPT | Performed by: RADIOLOGY

## 2023-10-17 PROCEDURE — 73620 X-RAY EXAM OF FOOT: CPT

## 2023-10-17 PROCEDURE — 73610 X-RAY EXAM OF ANKLE: CPT | Performed by: RADIOLOGY

## 2023-10-17 PROCEDURE — 99283 EMERGENCY DEPT VISIT LOW MDM: CPT

## 2023-10-17 RX ORDER — IBUPROFEN 800 MG/1
800 TABLET ORAL EVERY 8 HOURS PRN
Qty: 15 TABLET | Refills: 0 | Status: SHIPPED | OUTPATIENT
Start: 2023-10-17 | End: 2023-10-22

## 2023-10-17 RX ORDER — IBUPROFEN 400 MG/1
800 TABLET ORAL ONCE
Status: COMPLETED | OUTPATIENT
Start: 2023-10-17 | End: 2023-10-17

## 2023-10-17 RX ADMIN — IBUPROFEN 800 MG: 400 TABLET ORAL at 18:43

## 2023-10-17 NOTE — ED PROVIDER NOTES
Subjective   History of Present Illness  Patient is a 28-year-old female with significant past medical history positive for anxiety, depression, GERD, hypertension, kidney stones presenting to the ER complaints of left ankle pain.  Patient reports that 3 days ago she twisted her ankle and it still swelling painful with weightbearing.  Patient denies fever, cough, nausea, vomiting, any additional injuries or symptoms today.  Patient has been playing ice with no relief.  Patient denies any alleviating factors.    History provided by:  Patient   used: No        Review of Systems   Constitutional: Negative.  Negative for fever.   HENT: Negative.     Respiratory: Negative.     Cardiovascular: Negative.  Negative for chest pain.   Gastrointestinal: Negative.  Negative for abdominal pain.   Endocrine: Negative.    Genitourinary: Negative.  Negative for dysuria.   Musculoskeletal:  Positive for arthralgias.        Left ankle pain and swelling.   Skin: Negative.    Neurological: Negative.    Psychiatric/Behavioral: Negative.     All other systems reviewed and are negative.      Past Medical History:   Diagnosis Date    Anxiety     Depression     GERD (gastroesophageal reflux disease)     Headache     Hypertension     Kidney stone     Obesity        No Known Allergies    Past Surgical History:   Procedure Laterality Date    CYSTECTOMY      chest wall    CYSTOSCOPY, URETEROSCOPY, RETROGRADE PYELOGRAM, STENT INSERTION Right 7/14/2023    Procedure: CYSTOSCOPY URETEROSCOPY RETROGRADE PYELOGRAM HOLMIUM LASER;  Surgeon: Demond Clarke MD;  Location: [facility];  Service: Urology;  Laterality: Right;    DILATATION AND CURETTAGE      EXTRACORPOREAL SHOCK WAVE LITHOTRIPSY (ESWL) Right 6/30/2023    Procedure: EXTRACORPOREAL SHOCKWAVE LITHOTRIPSY;  Surgeon: Demond Clarke MD;  Location: [facility];  Service: Urology;  Laterality: Right;    GALLBLADDER SURGERY  06/02/2020    WISDOM TOOTH EXTRACTION   2014       Family History   Problem Relation Age of Onset    Hypertension Mother     Thyroid disease Mother     Cancer Mother         breast    No Known Problems Father         car wreck     Breast cancer Paternal Grandmother     Hypertension Paternal Grandmother     Arthritis Paternal Grandmother     Cancer Paternal Grandmother         Breast       Social History     Socioeconomic History    Marital status: Single   Tobacco Use    Smoking status: Every Day     Packs/day: 0.50     Years: 6.00     Additional pack years: 0.00     Total pack years: 3.00     Types: Cigarettes    Smokeless tobacco: Never   Vaping Use    Vaping Use: Some days    Substances: Nicotine, Flavoring    Devices: Disposable   Substance and Sexual Activity    Alcohol use: Yes     Comment: pt not had a drink in a month    Drug use: Yes     Types: Marijuana     Comment: 2 weeks since use    Sexual activity: Defer     Partners: Male     Birth control/protection: None           Objective   Physical Exam  Vitals and nursing note reviewed.   Constitutional:       General: She is not in acute distress.     Appearance: She is well-developed. She is not diaphoretic.   HENT:      Head: Normocephalic and atraumatic.      Right Ear: External ear normal.      Left Ear: External ear normal.      Nose: Nose normal.   Eyes:      Conjunctiva/sclera: Conjunctivae normal.      Pupils: Pupils are equal, round, and reactive to light.   Neck:      Vascular: No JVD.      Trachea: No tracheal deviation.   Cardiovascular:      Rate and Rhythm: Normal rate and regular rhythm.      Heart sounds: Normal heart sounds. No murmur heard.  Pulmonary:      Effort: Pulmonary effort is normal. No respiratory distress.      Breath sounds: Normal breath sounds. No wheezing.   Abdominal:      General: Bowel sounds are normal.      Palpations: Abdomen is soft.      Tenderness: There is no abdominal tenderness.   Musculoskeletal:         General: Swelling, tenderness and signs of injury  present. No deformity. Normal range of motion.      Cervical back: Normal range of motion and neck supple.   Skin:     General: Skin is warm and dry.      Coloration: Skin is not pale.      Findings: No erythema or rash.   Neurological:      Mental Status: She is alert and oriented to person, place, and time.      Cranial Nerves: No cranial nerve deficit.   Psychiatric:         Behavior: Behavior normal.         Thought Content: Thought content normal.         Procedures       XR Ankle 3+ View Left   Final Result   Soft tissue swelling without acute osseous abnormality evident.            This report was finalized on 10/17/2023 6:13 PM by Alex Pallas, DO.          XR Foot 2 View Left   Final Result   Soft tissue swelling without acute osseous abnormality evident.            This report was finalized on 10/17/2023 6:13 PM by Alex Pallas, DO.               ED Course                                           Medical Decision Making  Patient is a 28-year-old female with significant past medical history positive for anxiety, depression, GERD, hypertension, kidney stones presenting to the ER complaints of left ankle pain.  Patient reports that 3 days ago she twisted her ankle and it still swelling painful with weightbearing.  Patient denies fever, cough, nausea, vomiting, any additional injuries or symptoms today.  Patient has been playing ice with no relief.  Patient denies any alleviating factors.    Advised patient to return to the ER with new or worsening symptoms.  Advised patient to follow-up with PCP.  Patient verbalized understanding and agrees.  Vital signs are stable at discharge.  Patient is in no acute distress.    Problems Addressed:  Sprain of left ankle, unspecified ligament, initial encounter: complicated acute illness or injury    Amount and/or Complexity of Data Reviewed  Radiology: ordered.    Risk  Prescription drug management.        Final diagnoses:   Sprain of left ankle, unspecified ligament,  initial encounter       ED Disposition  ED Disposition       ED Disposition   Discharge    Condition   Stable    Comment   --               Angi Molina, APRN  [address] [facility] [id number] [phone]    Schedule an appointment as soon as possible for a visit in 2 days  As needed, If symptoms worsen         Medication List        New Prescriptions      ibuprofen 800 MG tablet  Commonly known as: ADVIL,MOTRIN  Take 1 tablet by mouth Every 8 (Eight) Hours As Needed for Moderate Pain for up to 5 days.            Stop      ketorolac 10 MG tablet  Commonly known as: TORADOL     oxyCODONE-acetaminophen  MG per tablet  Commonly known as: Percocet               Where to Get Your Medications        These medications were sent to [place] [address] - [phone] [facility] [phone] [address] [address]      Phone: [phone]   ibuprofen 800 MG tablet            Jacinta Roche, APRN  10/17/23 2475

## 2023-10-17 NOTE — ED NOTES
MEDICAL SCREENING:    Reason for Visit: Patient presents with acute injury to left foot and ankle. She injured it Sunday morning and has had swelling and pain since. She cannot bear weight onto the left leg.  She is also complaining of an abscessed tooth.     Patient initially seen in triage.  The patient was advised further evaluation and diagnostic testing will be needed, some of the treatment and testing will be initiated in the lobby in order to begin the process.  The patient will be returned to the waiting area for the time being and possibly be re-assessed by a subsequent ED provider.  The patient will be brought back to the treatment area in as timely manner as possible.       Liss Salas PA-C  10/17/23 [id number]

## 2025-06-30 ENCOUNTER — APPOINTMENT (OUTPATIENT)
Dept: CT IMAGING | Facility: HOSPITAL | Age: 30
End: 2025-06-30
Payer: MEDICAID

## 2025-06-30 ENCOUNTER — HOSPITAL ENCOUNTER (EMERGENCY)
Facility: HOSPITAL | Age: 30
Discharge: HOME | End: 2025-06-30
Attending: STUDENT IN AN ORGANIZED HEALTH CARE EDUCATION/TRAINING PROGRAM | Admitting: STUDENT IN AN ORGANIZED HEALTH CARE EDUCATION/TRAINING PROGRAM
Payer: MEDICAID

## 2025-06-30 VITALS
TEMPERATURE: 98.1 F | HEART RATE: 90 BPM | WEIGHT: 230 LBS | BODY MASS INDEX: 32.93 KG/M2 | SYSTOLIC BLOOD PRESSURE: 156 MMHG | DIASTOLIC BLOOD PRESSURE: 98 MMHG | RESPIRATION RATE: 16 BRPM | OXYGEN SATURATION: 100 % | HEIGHT: 70 IN

## 2025-06-30 DIAGNOSIS — K21.00 GASTROESOPHAGEAL REFLUX DISEASE WITH ESOPHAGITIS WITHOUT HEMORRHAGE: Primary | ICD-10-CM

## 2025-06-30 LAB
ALBUMIN SERPL-MCNC: 4.3 G/DL (ref 3.5–5.2)
ALBUMIN/GLOB SERPL: 1.4 G/DL
ALP SERPL-CCNC: 104 U/L (ref 39–117)
ALT SERPL W P-5'-P-CCNC: 20 U/L (ref 1–33)
AMORPH URATE CRY URNS QL MICRO: ABNORMAL /HPF
ANION GAP SERPL CALCULATED.3IONS-SCNC: 13.9 MMOL/L (ref 5–15)
AST SERPL-CCNC: 22 U/L (ref 1–32)
BACTERIA UR QL AUTO: ABNORMAL /HPF
BASOPHILS # BLD AUTO: 0.02 10*3/MM3 (ref 0–0.2)
BASOPHILS NFR BLD AUTO: 0.2 % (ref 0–1.5)
BILIRUB SERPL-MCNC: 0.3 MG/DL (ref 0–1.2)
BILIRUB UR QL STRIP: NEGATIVE
BUN SERPL-MCNC: 8.4 MG/DL (ref 6–20)
BUN/CREAT SERPL: 12.2 (ref 7–25)
CALCIUM SPEC-SCNC: 9.3 MG/DL (ref 8.6–10.5)
CHLORIDE SERPL-SCNC: 106 MMOL/L (ref 98–107)
CLARITY UR: ABNORMAL
CO2 SERPL-SCNC: 22.1 MMOL/L (ref 22–29)
COLOR UR: YELLOW
CREAT SERPL-MCNC: 0.69 MG/DL (ref 0.57–1)
DEPRECATED RDW RBC AUTO: 41.1 FL (ref 37–54)
EGFRCR SERPLBLD CKD-EPI 2021: 121.4 ML/MIN/1.73
EOSINOPHIL # BLD AUTO: 0.01 10*3/MM3 (ref 0–0.4)
EOSINOPHIL NFR BLD AUTO: 0.1 % (ref 0.3–6.2)
ERYTHROCYTE [DISTWIDTH] IN BLOOD BY AUTOMATED COUNT: 14.2 % (ref 12.3–15.4)
GLOBULIN UR ELPH-MCNC: 3 GM/DL
GLUCOSE SERPL-MCNC: 161 MG/DL (ref 65–99)
GLUCOSE UR STRIP-MCNC: NEGATIVE MG/DL
HCG SERPL QL: NEGATIVE
HCT VFR BLD AUTO: 40.8 % (ref 34–46.6)
HGB BLD-MCNC: 12.7 G/DL (ref 12–15.9)
HGB UR QL STRIP.AUTO: NEGATIVE
HOLD SPECIMEN: NORMAL
HYALINE CASTS UR QL AUTO: ABNORMAL /LPF
IMM GRANULOCYTES # BLD AUTO: 0.06 10*3/MM3 (ref 0–0.05)
IMM GRANULOCYTES NFR BLD AUTO: 0.6 % (ref 0–0.5)
KETONES UR QL STRIP: ABNORMAL
LEUKOCYTE ESTERASE UR QL STRIP.AUTO: NEGATIVE
LIPASE SERPL-CCNC: 25 U/L (ref 13–60)
LYMPHOCYTES # BLD AUTO: 0.95 10*3/MM3 (ref 0.7–3.1)
LYMPHOCYTES NFR BLD AUTO: 9.3 % (ref 19.6–45.3)
MCH RBC QN AUTO: 24.9 PG (ref 26.6–33)
MCHC RBC AUTO-ENTMCNC: 31.1 G/DL (ref 31.5–35.7)
MCV RBC AUTO: 79.8 FL (ref 79–97)
MONOCYTES # BLD AUTO: 0.25 10*3/MM3 (ref 0.1–0.9)
MONOCYTES NFR BLD AUTO: 2.4 % (ref 5–12)
NEUTROPHILS NFR BLD AUTO: 8.92 10*3/MM3 (ref 1.7–7)
NEUTROPHILS NFR BLD AUTO: 87.4 % (ref 42.7–76)
NITRITE UR QL STRIP: NEGATIVE
NRBC BLD AUTO-RTO: 0 /100 WBC (ref 0–0.2)
PH UR STRIP.AUTO: <=5 [PH] (ref 5–8)
PLATELET # BLD AUTO: 385 10*3/MM3 (ref 140–450)
PMV BLD AUTO: 10.1 FL (ref 6–12)
POTASSIUM SERPL-SCNC: 3.7 MMOL/L (ref 3.5–5.2)
PROT SERPL-MCNC: 7.3 G/DL (ref 6–8.5)
PROT UR QL STRIP: ABNORMAL
RBC # BLD AUTO: 5.11 10*6/MM3 (ref 3.77–5.28)
RBC # UR STRIP: ABNORMAL /HPF
REF LAB TEST METHOD: ABNORMAL
SODIUM SERPL-SCNC: 142 MMOL/L (ref 136–145)
SP GR UR STRIP: 1.03 (ref 1–1.03)
SQUAMOUS #/AREA URNS HPF: ABNORMAL /HPF
UROBILINOGEN UR QL STRIP: ABNORMAL
WBC # UR STRIP: ABNORMAL /HPF
WBC NRBC COR # BLD AUTO: 10.21 10*3/MM3 (ref 3.4–10.8)
WHOLE BLOOD HOLD COAG: NORMAL
WHOLE BLOOD HOLD SPECIMEN: NORMAL

## 2025-06-30 PROCEDURE — 74177 CT ABD & PELVIS W/CONTRAST: CPT

## 2025-06-30 PROCEDURE — 96374 THER/PROPH/DIAG INJ IV PUSH: CPT

## 2025-06-30 PROCEDURE — 25010000002 ONDANSETRON PER 1 MG: Performed by: STUDENT IN AN ORGANIZED HEALTH CARE EDUCATION/TRAINING PROGRAM

## 2025-06-30 PROCEDURE — 84703 CHORIONIC GONADOTROPIN ASSAY: CPT | Performed by: STUDENT IN AN ORGANIZED HEALTH CARE EDUCATION/TRAINING PROGRAM

## 2025-06-30 PROCEDURE — 96375 TX/PRO/DX INJ NEW DRUG ADDON: CPT

## 2025-06-30 PROCEDURE — 74177 CT ABD & PELVIS W/CONTRAST: CPT | Performed by: RADIOLOGY

## 2025-06-30 PROCEDURE — 80053 COMPREHEN METABOLIC PANEL: CPT | Performed by: STUDENT IN AN ORGANIZED HEALTH CARE EDUCATION/TRAINING PROGRAM

## 2025-06-30 PROCEDURE — 25810000003 SODIUM CHLORIDE 0.9 % SOLUTION: Performed by: STUDENT IN AN ORGANIZED HEALTH CARE EDUCATION/TRAINING PROGRAM

## 2025-06-30 PROCEDURE — 36415 COLL VENOUS BLD VENIPUNCTURE: CPT

## 2025-06-30 PROCEDURE — 25010000002 KETOROLAC TROMETHAMINE PER 15 MG: Performed by: STUDENT IN AN ORGANIZED HEALTH CARE EDUCATION/TRAINING PROGRAM

## 2025-06-30 PROCEDURE — 83690 ASSAY OF LIPASE: CPT | Performed by: STUDENT IN AN ORGANIZED HEALTH CARE EDUCATION/TRAINING PROGRAM

## 2025-06-30 PROCEDURE — 99285 EMERGENCY DEPT VISIT HI MDM: CPT

## 2025-06-30 PROCEDURE — 25510000001 IOPAMIDOL 61 % SOLUTION: Performed by: STUDENT IN AN ORGANIZED HEALTH CARE EDUCATION/TRAINING PROGRAM

## 2025-06-30 PROCEDURE — 85025 COMPLETE CBC W/AUTO DIFF WBC: CPT | Performed by: STUDENT IN AN ORGANIZED HEALTH CARE EDUCATION/TRAINING PROGRAM

## 2025-06-30 PROCEDURE — 81001 URINALYSIS AUTO W/SCOPE: CPT | Performed by: STUDENT IN AN ORGANIZED HEALTH CARE EDUCATION/TRAINING PROGRAM

## 2025-06-30 RX ORDER — LIDOCAINE HYDROCHLORIDE 20 MG/ML
15 SOLUTION OROPHARYNGEAL ONCE
Status: COMPLETED | OUTPATIENT
Start: 2025-06-30 | End: 2025-06-30

## 2025-06-30 RX ORDER — SUCRALFATE 1 G/1
1 TABLET ORAL ONCE
Status: COMPLETED | OUTPATIENT
Start: 2025-06-30 | End: 2025-06-30

## 2025-06-30 RX ORDER — SUCRALFATE 1 G/1
1 TABLET ORAL 4 TIMES DAILY
Qty: 120 TABLET | Refills: 0 | Status: SHIPPED | OUTPATIENT
Start: 2025-06-30 | End: 2025-07-30

## 2025-06-30 RX ORDER — KETOROLAC TROMETHAMINE 30 MG/ML
30 INJECTION, SOLUTION INTRAMUSCULAR; INTRAVENOUS ONCE
Status: COMPLETED | OUTPATIENT
Start: 2025-06-30 | End: 2025-06-30

## 2025-06-30 RX ORDER — SODIUM CHLORIDE 0.9 % (FLUSH) 0.9 %
10 SYRINGE (ML) INJECTION AS NEEDED
Status: DISCONTINUED | OUTPATIENT
Start: 2025-06-30 | End: 2025-06-30 | Stop reason: HOSPADM

## 2025-06-30 RX ORDER — ONDANSETRON 2 MG/ML
4 INJECTION INTRAMUSCULAR; INTRAVENOUS ONCE
Status: COMPLETED | OUTPATIENT
Start: 2025-06-30 | End: 2025-06-30

## 2025-06-30 RX ORDER — ALUMINA, MAGNESIA, AND SIMETHICONE 2400; 2400; 240 MG/30ML; MG/30ML; MG/30ML
15 SUSPENSION ORAL ONCE
Status: COMPLETED | OUTPATIENT
Start: 2025-06-30 | End: 2025-06-30

## 2025-06-30 RX ORDER — PHENOBARBITAL, HYOSCYAMINE SULFATE, ATROPINE SULFATE AND SCOPOLAMINE HYDROBROMIDE .0194; .1037; 16.2; .0065 MG/1; MG/1; MG/1; MG/1
2 TABLET ORAL ONCE
Status: COMPLETED | OUTPATIENT
Start: 2025-06-30 | End: 2025-06-30

## 2025-06-30 RX ORDER — ONDANSETRON 4 MG/1
4 TABLET, FILM COATED ORAL EVERY 8 HOURS PRN
Qty: 30 TABLET | Refills: 0 | Status: SHIPPED | OUTPATIENT
Start: 2025-06-30

## 2025-06-30 RX ORDER — IOPAMIDOL 612 MG/ML
100 INJECTION, SOLUTION INTRAVASCULAR
Status: COMPLETED | OUTPATIENT
Start: 2025-06-30 | End: 2025-06-30

## 2025-06-30 RX ORDER — PANTOPRAZOLE SODIUM 40 MG/1
40 TABLET, DELAYED RELEASE ORAL 2 TIMES DAILY
Qty: 60 TABLET | Refills: 0 | Status: SHIPPED | OUTPATIENT
Start: 2025-06-30 | End: 2025-07-30

## 2025-06-30 RX ADMIN — ALUMINUM HYDROXIDE, MAGNESIUM HYDROXIDE, AND DIMETHICONE 15 ML: 400; 400; 40 SUSPENSION ORAL at 13:13

## 2025-06-30 RX ADMIN — SUCRALFATE 1 G: 1 TABLET ORAL at 13:12

## 2025-06-30 RX ADMIN — IOPAMIDOL 100 ML: 612 INJECTION, SOLUTION INTRAVENOUS at 12:00

## 2025-06-30 RX ADMIN — PHENOBARBITAL, HYOSCYAMINE SULFATE, ATROPINE SULFATE AND SCOPOLAMINE HYDROBROMIDE 32.4 MG: .0194; .1037; 16.2; .0065 TABLET ORAL at 13:12

## 2025-06-30 RX ADMIN — LIDOCAINE HYDROCHLORIDE 15 ML: 20 SOLUTION ORAL at 13:13

## 2025-06-30 RX ADMIN — ONDANSETRON 4 MG: 2 INJECTION, SOLUTION INTRAMUSCULAR; INTRAVENOUS at 11:17

## 2025-06-30 RX ADMIN — KETOROLAC TROMETHAMINE 30 MG: 30 INJECTION INTRAMUSCULAR; INTRAVENOUS at 12:22

## 2025-06-30 RX ADMIN — SODIUM CHLORIDE 1000 ML: 9 INJECTION, SOLUTION INTRAVENOUS at 11:18

## 2025-06-30 NOTE — Clinical Note
[facility] EMERGENCY DEPARTMENT  [address] [facility] [address]  Phone: [phone]    Sandy Stephenson was seen and treated in our emergency department on 6/30/2025.  She may return to work on 07/02/2025.         Thank you for choosing [facility].    Juan Guerrero, DO

## 2025-06-30 NOTE — ED PROVIDER NOTES
Subjective   History of Present Illness  Patient is a 30-year-old female who comes to the ER with complaints of midepigastric abdominal pain.  She reports chronic cough and history of reflux and was told in the past she has a large hiatal hernia.  She denies any fevers or chills or urinary symptoms.  She knows she has ovarian cyst but says this pain is different than what she is used to.  She denies any productive cough, chest pain/pressure palpitations.      Review of Systems   Constitutional:  Negative for chills, fatigue and fever.   HENT:  Negative for ear pain, sinus pain and sore throat.    Respiratory:  Positive for cough. Negative for chest tightness, shortness of breath and wheezing.    Cardiovascular:  Negative for chest pain, palpitations and leg swelling.   Gastrointestinal:  Positive for nausea. Negative for abdominal pain, constipation, diarrhea and vomiting.   Genitourinary:  Negative for dysuria, hematuria and urgency.   Musculoskeletal:  Negative for arthralgias and myalgias.   Neurological:  Negative for dizziness, syncope and light-headedness.   Psychiatric/Behavioral:  Negative for confusion.        Past Medical History:   Diagnosis Date    Anxiety     Depression     GERD (gastroesophageal reflux disease)     Headache     Hypertension     Kidney stone     Obesity        No Known Allergies    Past Surgical History:   Procedure Laterality Date    CYSTECTOMY      chest wall    CYSTOSCOPY, URETEROSCOPY, RETROGRADE PYELOGRAM, STENT INSERTION Right 7/14/2023    Procedure: CYSTOSCOPY URETEROSCOPY RETROGRADE PYELOGRAM HOLMIUM LASER;  Surgeon: eDmond Clarke MD;  Location: [facility];  Service: Urology;  Laterality: Right;    DILATATION AND CURETTAGE      EXTRACORPOREAL SHOCK WAVE LITHOTRIPSY (ESWL) Right 6/30/2023    Procedure: EXTRACORPOREAL SHOCKWAVE LITHOTRIPSY;  Surgeon: Demond Clarke MD;  Location: [facility];  Service: Urology;  Laterality: Right;    GALLBLADDER SURGERY  06/02/2020     WISDOM TOOTH EXTRACTION  2014       Family History   Problem Relation Age of Onset    Hypertension Mother     Thyroid disease Mother     Cancer Mother         breast    No Known Problems Father         car wreck     Breast cancer Paternal Grandmother     Hypertension Paternal Grandmother     Arthritis Paternal Grandmother     Cancer Paternal Grandmother         Breast       Social History     Socioeconomic History    Marital status: Single   Tobacco Use    Smoking status: Every Day     Current packs/day: 0.50     Average packs/day: 0.5 packs/day for 6.0 years (3.0 ttl pk-yrs)     Types: Cigarettes    Smokeless tobacco: Never   Vaping Use    Vaping status: Some Days    Substances: Nicotine, Flavoring    Devices: Disposable   Substance and Sexual Activity    Alcohol use: Yes     Comment: pt not had a drink in a month    Drug use: Yes     Types: Marijuana     Comment: 2 weeks since use    Sexual activity: Defer     Partners: Male     Birth control/protection: None           Objective   Physical Exam  Vitals and nursing note reviewed. Exam conducted with a chaperone present.   Constitutional:       Appearance: She is well-developed. She is obese.   HENT:      Head: Normocephalic and atraumatic.      Mouth/Throat:      Mouth: Mucous membranes are moist.      Pharynx: Oropharynx is clear.   Eyes:      Extraocular Movements: Extraocular movements intact.      Pupils: Pupils are equal, round, and reactive to light.   Cardiovascular:      Rate and Rhythm: Normal rate and regular rhythm.   Pulmonary:      Effort: Pulmonary effort is normal.      Breath sounds: Normal breath sounds.   Abdominal:      General: Abdomen is flat. Bowel sounds are normal.      Palpations: Abdomen is soft.      Tenderness: There is abdominal tenderness in the epigastric area.   Skin:     General: Skin is warm and dry.   Neurological:      General: No focal deficit present.      Mental Status: She is alert and oriented to person, place, and time.    Psychiatric:         Mood and Affect: Mood normal.         Behavior: Behavior normal.         Procedures           ED Course                                                       Medical Decision Making  --Patient is hemodynamically stable, exam benign  --Labs unremarkable  --CT Abdo pelvis with ovarian cysts  --PPI twice daily, Carafate  --Refer to outpatient surgery for EGD and evaluation of potential hernia    Problems Addressed:  Gastroesophageal reflux disease with esophagitis without hemorrhage: complicated acute illness or injury    Amount and/or Complexity of Data Reviewed  Labs: ordered.  Radiology: ordered.    Risk  OTC drugs.  Prescription drug management.        Final diagnoses:   Gastroesophageal reflux disease with esophagitis without hemorrhage       ED Disposition  ED Disposition       ED Disposition   Discharge    Condition   Stable    Comment   --               Angi Molina, APRN  [address] [facility] [id number] [phone]    In 1 week      Tunde Blanca MD  [address] [place], [place] [id number]  [phone]    Schedule an appointment as soon as possible for a visit in 1 week  EGD/persistent reflux         Medication List        New Prescriptions      ondansetron 4 MG tablet  Commonly known as: Zofran  Take 1 tablet by mouth Every 8 (Eight) Hours As Needed for Nausea or Vomiting.     pantoprazole 40 MG EC tablet  Commonly known as: Protonix  Take 1 tablet by mouth 2 (Two) Times a Day for 30 days.     sucralfate 1 g tablet  Commonly known as: CARAFATE  Take 1 tablet by mouth 4 (Four) Times a Day for 30 days.            Stop      omeprazole 40 MG capsule  Commonly known as: priLOSEC               Where to Get Your Medications        These medications were sent to [place] [address] - [phone] [facility] [phone] [address] [address]      Phone: [phone]   ondansetron 4 MG tablet  pantoprazole 40 MG EC tablet  sucralfate 1 g  tablet            Juan Guerrero, DO  06/30/25 1247

## 2025-07-02 ENCOUNTER — APPOINTMENT (OUTPATIENT)
Dept: CT IMAGING | Facility: HOSPITAL | Age: 30
End: 2025-07-02
Payer: MEDICAID

## 2025-07-02 ENCOUNTER — HOSPITAL ENCOUNTER (EMERGENCY)
Facility: HOSPITAL | Age: 30
Discharge: HOME | End: 2025-07-02
Attending: STUDENT IN AN ORGANIZED HEALTH CARE EDUCATION/TRAINING PROGRAM | Admitting: STUDENT IN AN ORGANIZED HEALTH CARE EDUCATION/TRAINING PROGRAM
Payer: MEDICAID

## 2025-07-02 VITALS
HEART RATE: 68 BPM | TEMPERATURE: 97.8 F | DIASTOLIC BLOOD PRESSURE: 66 MMHG | RESPIRATION RATE: 15 BRPM | BODY MASS INDEX: 32.93 KG/M2 | HEIGHT: 70 IN | SYSTOLIC BLOOD PRESSURE: 98 MMHG | WEIGHT: 230 LBS | OXYGEN SATURATION: 99 %

## 2025-07-02 DIAGNOSIS — R10.84 GENERALIZED ABDOMINAL PAIN: Primary | ICD-10-CM

## 2025-07-02 LAB
ALBUMIN SERPL-MCNC: 4 G/DL (ref 3.5–5.2)
ALBUMIN/GLOB SERPL: 1.5 G/DL
ALP SERPL-CCNC: 83 U/L (ref 39–117)
ALT SERPL W P-5'-P-CCNC: 24 U/L (ref 1–33)
ANION GAP SERPL CALCULATED.3IONS-SCNC: 11.7 MMOL/L (ref 5–15)
AST SERPL-CCNC: 20 U/L (ref 1–32)
B-HCG UR QL: NEGATIVE
BACTERIA UR QL AUTO: ABNORMAL /HPF
BASOPHILS # BLD AUTO: 0.04 10*3/MM3 (ref 0–0.2)
BASOPHILS NFR BLD AUTO: 0.5 % (ref 0–1.5)
BILIRUB SERPL-MCNC: 0.3 MG/DL (ref 0–1.2)
BILIRUB UR QL STRIP: NEGATIVE
BUN SERPL-MCNC: 8.1 MG/DL (ref 6–20)
BUN/CREAT SERPL: 10.8 (ref 7–25)
CALCIUM SPEC-SCNC: 9 MG/DL (ref 8.6–10.5)
CHLORIDE SERPL-SCNC: 104 MMOL/L (ref 98–107)
CLARITY UR: ABNORMAL
CO2 SERPL-SCNC: 24.3 MMOL/L (ref 22–29)
COLOR UR: YELLOW
CREAT SERPL-MCNC: 0.75 MG/DL (ref 0.57–1)
CRP SERPL-MCNC: 0.53 MG/DL (ref 0–0.5)
DEPRECATED RDW RBC AUTO: 41.1 FL (ref 37–54)
EGFRCR SERPLBLD CKD-EPI 2021: 111.4 ML/MIN/1.73
EOSINOPHIL # BLD AUTO: 0.13 10*3/MM3 (ref 0–0.4)
EOSINOPHIL NFR BLD AUTO: 1.6 % (ref 0.3–6.2)
ERYTHROCYTE [DISTWIDTH] IN BLOOD BY AUTOMATED COUNT: 14 % (ref 12.3–15.4)
GLOBULIN UR ELPH-MCNC: 2.7 GM/DL
GLUCOSE SERPL-MCNC: 102 MG/DL (ref 65–99)
GLUCOSE UR STRIP-MCNC: NEGATIVE MG/DL
H PYLORI IGG SER IA-ACNC: NEGATIVE
HCT VFR BLD AUTO: 37.9 % (ref 34–46.6)
HGB BLD-MCNC: 11.8 G/DL (ref 12–15.9)
HGB UR QL STRIP.AUTO: NEGATIVE
HOLD SPECIMEN: NORMAL
HOLD SPECIMEN: NORMAL
HYALINE CASTS UR QL AUTO: ABNORMAL /LPF
IMM GRANULOCYTES # BLD AUTO: 0.03 10*3/MM3 (ref 0–0.05)
IMM GRANULOCYTES NFR BLD AUTO: 0.4 % (ref 0–0.5)
KETONES UR QL STRIP: ABNORMAL
LEUKOCYTE ESTERASE UR QL STRIP.AUTO: ABNORMAL
LIPASE SERPL-CCNC: 101 U/L (ref 13–60)
LYMPHOCYTES # BLD AUTO: 1.37 10*3/MM3 (ref 0.7–3.1)
LYMPHOCYTES NFR BLD AUTO: 17 % (ref 19.6–45.3)
MAGNESIUM SERPL-MCNC: 1.8 MG/DL (ref 1.6–2.6)
MCH RBC QN AUTO: 25.1 PG (ref 26.6–33)
MCHC RBC AUTO-ENTMCNC: 31.1 G/DL (ref 31.5–35.7)
MCV RBC AUTO: 80.5 FL (ref 79–97)
MONOCYTES # BLD AUTO: 0.41 10*3/MM3 (ref 0.1–0.9)
MONOCYTES NFR BLD AUTO: 5.1 % (ref 5–12)
NEUTROPHILS NFR BLD AUTO: 6.09 10*3/MM3 (ref 1.7–7)
NEUTROPHILS NFR BLD AUTO: 75.4 % (ref 42.7–76)
NITRITE UR QL STRIP: NEGATIVE
NRBC BLD AUTO-RTO: 0 /100 WBC (ref 0–0.2)
PH UR STRIP.AUTO: <=5 [PH] (ref 5–8)
PLATELET # BLD AUTO: 317 10*3/MM3 (ref 140–450)
PMV BLD AUTO: 10.1 FL (ref 6–12)
POTASSIUM SERPL-SCNC: 3.8 MMOL/L (ref 3.5–5.2)
PROT SERPL-MCNC: 6.7 G/DL (ref 6–8.5)
PROT UR QL STRIP: NEGATIVE
RBC # BLD AUTO: 4.71 10*6/MM3 (ref 3.77–5.28)
RBC # UR STRIP: ABNORMAL /HPF
REF LAB TEST METHOD: ABNORMAL
SODIUM SERPL-SCNC: 140 MMOL/L (ref 136–145)
SP GR UR STRIP: 1.02 (ref 1–1.03)
SQUAMOUS #/AREA URNS HPF: ABNORMAL /HPF
UROBILINOGEN UR QL STRIP: ABNORMAL
WBC # UR STRIP: ABNORMAL /HPF
WBC NRBC COR # BLD AUTO: 8.07 10*3/MM3 (ref 3.4–10.8)
WHOLE BLOOD HOLD COAG: NORMAL
WHOLE BLOOD HOLD SPECIMEN: NORMAL

## 2025-07-02 PROCEDURE — 83690 ASSAY OF LIPASE: CPT | Performed by: PHYSICIAN ASSISTANT

## 2025-07-02 PROCEDURE — 81025 URINE PREGNANCY TEST: CPT | Performed by: PHYSICIAN ASSISTANT

## 2025-07-02 PROCEDURE — 36415 COLL VENOUS BLD VENIPUNCTURE: CPT

## 2025-07-02 PROCEDURE — 25810000003 SODIUM CHLORIDE 0.9 % SOLUTION: Performed by: PHYSICIAN ASSISTANT

## 2025-07-02 PROCEDURE — 83735 ASSAY OF MAGNESIUM: CPT | Performed by: PHYSICIAN ASSISTANT

## 2025-07-02 PROCEDURE — 96374 THER/PROPH/DIAG INJ IV PUSH: CPT

## 2025-07-02 PROCEDURE — 86677 HELICOBACTER PYLORI ANTIBODY: CPT | Performed by: PHYSICIAN ASSISTANT

## 2025-07-02 PROCEDURE — 99284 EMERGENCY DEPT VISIT MOD MDM: CPT

## 2025-07-02 PROCEDURE — 85025 COMPLETE CBC W/AUTO DIFF WBC: CPT | Performed by: PHYSICIAN ASSISTANT

## 2025-07-02 PROCEDURE — 80053 COMPREHEN METABOLIC PANEL: CPT | Performed by: PHYSICIAN ASSISTANT

## 2025-07-02 PROCEDURE — 81001 URINALYSIS AUTO W/SCOPE: CPT | Performed by: PHYSICIAN ASSISTANT

## 2025-07-02 PROCEDURE — 86140 C-REACTIVE PROTEIN: CPT | Performed by: PHYSICIAN ASSISTANT

## 2025-07-02 PROCEDURE — 74176 CT ABD & PELVIS W/O CONTRAST: CPT

## 2025-07-02 PROCEDURE — 25010000002 PROCHLORPERAZINE 10 MG/2ML SOLUTION: Performed by: PHYSICIAN ASSISTANT

## 2025-07-02 PROCEDURE — 74176 CT ABD & PELVIS W/O CONTRAST: CPT | Performed by: RADIOLOGY

## 2025-07-02 RX ORDER — PROCHLORPERAZINE MALEATE 10 MG
10 TABLET ORAL EVERY 6 HOURS PRN
Qty: 20 TABLET | Refills: 0 | Status: SHIPPED | OUTPATIENT
Start: 2025-07-02

## 2025-07-02 RX ORDER — SODIUM CHLORIDE 0.9 % (FLUSH) 0.9 %
10 SYRINGE (ML) INJECTION AS NEEDED
Status: DISCONTINUED | OUTPATIENT
Start: 2025-07-02 | End: 2025-07-02 | Stop reason: HOSPADM

## 2025-07-02 RX ORDER — PROCHLORPERAZINE EDISYLATE 5 MG/ML
10 INJECTION INTRAMUSCULAR; INTRAVENOUS ONCE
Status: COMPLETED | OUTPATIENT
Start: 2025-07-02 | End: 2025-07-02

## 2025-07-02 RX ADMIN — PROCHLORPERAZINE EDISYLATE 10 MG: 5 INJECTION INTRAMUSCULAR; INTRAVENOUS at 10:58

## 2025-07-02 RX ADMIN — SODIUM CHLORIDE 1000 ML: 9 INJECTION, SOLUTION INTRAVENOUS at 10:59

## 2025-07-02 NOTE — ED PROVIDER NOTES
Subjective     History provided by:  Patient  Abdominal Pain  Pain location:  Generalized  Pain quality: aching    Pain radiates to:  Does not radiate  Pain severity:  Moderate  Onset quality:  Gradual  Timing:  Intermittent  Progression:  Waxing and waning  Chronicity:  Recurrent  Associated symptoms: nausea and vomiting    Associated symptoms: no chest pain, no dysuria and no fever        Review of Systems   Constitutional: Negative.  Negative for fever.   HENT: Negative.     Respiratory: Negative.     Cardiovascular: Negative.  Negative for chest pain.   Gastrointestinal:  Positive for abdominal pain, nausea and vomiting.   Endocrine: Negative.    Genitourinary: Negative.  Negative for dysuria.   Skin: Negative.    Neurological: Negative.    Psychiatric/Behavioral: Negative.     All other systems reviewed and are negative.      Past Medical History:   Diagnosis Date    Anxiety     Depression     GERD (gastroesophageal reflux disease)     Headache     Hypertension     Kidney stone     Obesity        No Known Allergies    Past Surgical History:   Procedure Laterality Date    CYSTECTOMY      chest wall    CYSTOSCOPY, URETEROSCOPY, RETROGRADE PYELOGRAM, STENT INSERTION Right 7/14/2023    Procedure: CYSTOSCOPY URETEROSCOPY RETROGRADE PYELOGRAM HOLMIUM LASER;  Surgeon: Demond Clarke MD;  Location: [facility];  Service: Urology;  Laterality: Right;    DILATATION AND CURETTAGE      EXTRACORPOREAL SHOCK WAVE LITHOTRIPSY (ESWL) Right 6/30/2023    Procedure: EXTRACORPOREAL SHOCKWAVE LITHOTRIPSY;  Surgeon: Demond Clarke MD;  Location: [facility];  Service: Urology;  Laterality: Right;    GALLBLADDER SURGERY  06/02/2020    WISDOM TOOTH EXTRACTION  2014       Family History   Problem Relation Age of Onset    Hypertension Mother     Thyroid disease Mother     Cancer Mother         breast    No Known Problems Father         car wreck     Breast cancer Paternal Grandmother     Hypertension Paternal Grandmother      Arthritis Paternal Grandmother     Cancer Paternal Grandmother         Breast       Social History     Socioeconomic History    Marital status: Single   Tobacco Use    Smoking status: Every Day     Current packs/day: 0.50     Average packs/day: 0.5 packs/day for 6.0 years (3.0 ttl pk-yrs)     Types: Cigarettes    Smokeless tobacco: Never   Vaping Use    Vaping status: Some Days    Substances: Nicotine, Flavoring    Devices: Disposable   Substance and Sexual Activity    Alcohol use: Yes     Comment: pt not had a drink in a month    Drug use: Yes     Types: Marijuana     Comment: 2 weeks since use    Sexual activity: Defer     Partners: Male     Birth control/protection: None           Objective   Physical Exam  Vitals and nursing note reviewed.   Constitutional:       General: She is not in acute distress.     Appearance: She is well-developed. She is not diaphoretic.   HENT:      Head: Normocephalic and atraumatic.      Right Ear: External ear normal.      Left Ear: External ear normal.      Nose: Nose normal.   Eyes:      Conjunctiva/sclera: Conjunctivae normal.      Pupils: Pupils are equal, round, and reactive to light.   Neck:      Vascular: No JVD.      Trachea: No tracheal deviation.   Cardiovascular:      Rate and Rhythm: Normal rate.      Heart sounds: No murmur heard.  Pulmonary:      Effort: Pulmonary effort is normal. No respiratory distress.      Breath sounds: No wheezing.   Abdominal:      Palpations: Abdomen is soft.      Tenderness: There is generalized abdominal tenderness.   Musculoskeletal:         General: No deformity. Normal range of motion.      Cervical back: Normal range of motion and neck supple.   Skin:     General: Skin is warm and dry.      Coloration: Skin is not pale.      Findings: No erythema or rash.   Neurological:      Mental Status: She is alert and oriented to person, place, and time.      Cranial Nerves: No cranial nerve deficit.   Psychiatric:         Behavior: Behavior  normal.         Thought Content: Thought content normal.         Procedures           ED Course  ED Course as of 07/02/25 1249   Wed Jul 02, 2025   1245 CT abd pelvis rad interpreted:    Right 4.8 cm hyperdense ovarian enlargement or hyperdense cyst could  represent hemorrhagic cyst.      [RB]      ED Course User Index  [RB] Matias Ortiz II, PA                                                       Medical Decision Making  Amount and/or Complexity of Data Reviewed  Labs: ordered.  Radiology: ordered.    Risk  Prescription drug management.        Final diagnoses:   Generalized abdominal pain       ED Disposition  ED Disposition       ED Disposition   Discharge    Condition   Stable    Comment   --               Angi Molina, APRN  [address] [place] [id number] [phone]    Schedule an appointment as soon as possible for a visit            Medication List        New Prescriptions      prochlorperazine 10 MG tablet  Commonly known as: COMPAZINE  Take 1 tablet by mouth Every 6 (Six) Hours As Needed for Nausea or Vomiting.               Where to Get Your Medications        These medications were sent to [address] - [phone] [place] [phone] [address] [address]      Phone: [phone]   prochlorperazine 10 MG tablet            Matias Ortiz II, PA  07/02/25 1249

## 2025-07-02 NOTE — Clinical Note
----- Message from Christiano Bray sent at 2/21/2020 11:07 AM CST -----  Regarding: Oxygen  Contact: [phone]  I've contacted patient regarding his o2 and he stated that he is only interested in the POC. I advised patient that we cannot provide just that unit or he can just purchase one himself. Patient declined delivery at this time and stated that he will contact your office. Thanks!   [facility] EMERGENCY DEPARTMENT  [address] [facility] [address]  Phone: [phone]    Sandy Stephenson was seen and treated in our emergency department on 7/2/2025.  She may return to work on 07/04/2025.         Thank you for choosing [facility].    Matias Ortiz II, PA

## 2025-07-16 ENCOUNTER — OFFICE VISIT (OUTPATIENT)
Dept: SURGERY | Age: 30
End: 2025-07-16
Payer: MEDICAID

## 2025-07-16 VITALS
HEIGHT: 70 IN | SYSTOLIC BLOOD PRESSURE: 145 MMHG | BODY MASS INDEX: 35.5 KG/M2 | DIASTOLIC BLOOD PRESSURE: 94 MMHG | WEIGHT: 248 LBS

## 2025-07-16 DIAGNOSIS — K21.9 GASTROESOPHAGEAL REFLUX DISEASE, UNSPECIFIED WHETHER ESOPHAGITIS PRESENT: Primary | ICD-10-CM

## 2025-07-16 PROCEDURE — 1160F RVW MEDS BY RX/DR IN RCRD: CPT | Performed by: SURGERY

## 2025-07-16 PROCEDURE — 3077F SYST BP >= 140 MM HG: CPT | Performed by: SURGERY

## 2025-07-16 PROCEDURE — 99203 OFFICE O/P NEW LOW 30 MIN: CPT | Performed by: SURGERY

## 2025-07-16 PROCEDURE — 1159F MED LIST DOCD IN RCRD: CPT | Performed by: SURGERY

## 2025-07-16 PROCEDURE — 3080F DIAST BP >= 90 MM HG: CPT | Performed by: SURGERY

## 2025-07-16 RX ORDER — SODIUM CHLORIDE 0.9 % (FLUSH) 0.9 %
10 SYRINGE (ML) INJECTION AS NEEDED
OUTPATIENT
Start: 2025-07-16

## 2025-07-16 RX ORDER — SODIUM CHLORIDE 0.9 % (FLUSH) 0.9 %
10 SYRINGE (ML) INJECTION EVERY 12 HOURS SCHEDULED
OUTPATIENT
Start: 2025-07-16

## 2025-07-16 RX ORDER — SODIUM CHLORIDE 9 MG/ML
40 INJECTION, SOLUTION INTRAVENOUS AS NEEDED
OUTPATIENT
Start: 2025-07-16

## 2025-07-16 NOTE — PROGRESS NOTES
Date of Service: 7/16/2025    Subjective   Sandy Stephenson is a 30 y.o. female is being seen for consultation for GERD today at the request of Angi Molina APRN    Chief complaint: GERD    Sandy Stephenson is a 30 y.o. class II obese female smoker who presents my clinic with worsening GERD.  She reports several months of epigastric abdominal pain.  She reports occasional nausea and occasional vomiting.  Occasionally worse with certain foods, especially spicy.  She denies dysphagia.  She is on pantoprazole 40 mg twice daily which somewhat helps her symptoms.  She also takes some Pepto-Bismol.    The patient's weight in the ER several weeks ago was noted to be 230 pounds (it is unclear if this is only a stated weight) but is 248 pounds today    Past Medical History:   Diagnosis Date    Anxiety     Depression     GERD (gastroesophageal reflux disease)     Headache     Hypertension     Kidney stone     Obesity      .  Obese  Past Surgical History:   Procedure Laterality Date    CYSTECTOMY      chest wall    CYSTOSCOPY, URETEROSCOPY, RETROGRADE PYELOGRAM, STENT INSERTION Right 7/14/2023    Procedure: CYSTOSCOPY URETEROSCOPY RETROGRADE PYELOGRAM HOLMIUM LASER;  Surgeon: Demond Clarke MD;  Location: [facility];  Service: Urology;  Laterality: Right;    DILATATION AND CURETTAGE      EXTRACORPOREAL SHOCK WAVE LITHOTRIPSY (ESWL) Right 6/30/2023    Procedure: EXTRACORPOREAL SHOCKWAVE LITHOTRIPSY;  Surgeon: Demond Clarke MD;  Location: [facility];  Service: Urology;  Laterality: Right;    GALLBLADDER SURGERY  06/02/2020    WISDOM TOOTH EXTRACTION  2014         Family History   Problem Relation Age of Onset    Hypertension Mother     Thyroid disease Mother     Cancer Mother         breast    No Known Problems Father         car wreck     Breast cancer Paternal Grandmother     Hypertension Paternal Grandmother     Arthritis Paternal Grandmother     Cancer Paternal Grandmother         Breast         Social  "History     Socioeconomic History    Marital status: Single   Tobacco Use    Smoking status: Every Day     Current packs/day: 0.50     Average packs/day: 0.5 packs/day for 6.0 years (3.0 ttl pk-yrs)     Types: Cigarettes    Smokeless tobacco: Never   Vaping Use    Vaping status: Some Days    Substances: Nicotine, Flavoring    Devices: Disposable   Substance and Sexual Activity    Alcohol use: Yes     Comment: pt not had a drink in a month    Drug use: Yes     Types: Marijuana     Comment: 2 weeks since use    Sexual activity: Defer     Partners: Male     Birth control/protection: None                Review of Systems     14 pt ROS negative except for what is noted in HPI        Objective     Physical Exam:      07/16/25  1024   Weight: 112 kg (248 lb)    Body mass index is 35.58 kg/m².  Constitution: /94   Ht 177.8 cm (70\")   Wt 112 kg (248 lb)   LMP 06/23/2025 (Approximate)   BMI 35.58 kg/m²  . No acute distress.  Obese  Head: Normocephalic, atraumatic.   Eyes: Aligned without strabismus. Conjunctivae noninjected   Ears, Nose, Mouth: Normal dentition, no lesions appreciated   Respiratory: Symmetric chest expansion. No respiratory distress.   Gastrointestinal:  Soft, nondistended, nontender  Skin:  No cyanosis, clubbing or edema bilaterally   Lymphatics: No abnormal cervical or supraclavicular adenopathy  Neurologic: No gross deficits.  Alert and oriented x3  Psychiatric: Normal mood              Sandy Stephenson is a 30 y.o. class II obese female female with GERD    We discussed factors that can exacerbate reflux including tobacco/nicotine abuse and weight gain.  We discussed the workup to include an upper GI study as well as EGD with Bravo pH probe study.  I may trial her on a more potent PPI after her Bravo study.      Class 2 Severe Obesity (BMI >=35 and <=39.9). Obesity-related health conditions include the following: hypertension and GERD. Obesity is unchanged. BMI is is above average; no BMI " management plan is appropriate. We discussed portion control, increasing exercise, and Information on healthy weight added to patient's after visit summary.              This document has been electronically signed by Tunde Blanca MD   July 16, 2025 10:43 T    [facility]

## 2025-07-29 ENCOUNTER — APPOINTMENT (OUTPATIENT)
Dept: GENERAL RADIOLOGY | Facility: HOSPITAL | Age: 30
End: 2025-07-29
Payer: MEDICAID

## 2025-07-29 ENCOUNTER — APPOINTMENT (OUTPATIENT)
Facility: HOSPITAL | Age: 30
End: 2025-07-29
Payer: MEDICAID

## 2025-07-29 ENCOUNTER — HOSPITAL ENCOUNTER (EMERGENCY)
Facility: HOSPITAL | Age: 30
Discharge: HOME | End: 2025-07-29
Attending: STUDENT IN AN ORGANIZED HEALTH CARE EDUCATION/TRAINING PROGRAM | Admitting: STUDENT IN AN ORGANIZED HEALTH CARE EDUCATION/TRAINING PROGRAM
Payer: MEDICAID

## 2025-07-29 VITALS
TEMPERATURE: 98.2 F | DIASTOLIC BLOOD PRESSURE: 83 MMHG | HEIGHT: 70 IN | OXYGEN SATURATION: 98 % | RESPIRATION RATE: 18 BRPM | SYSTOLIC BLOOD PRESSURE: 141 MMHG | WEIGHT: 248 LBS | HEART RATE: 82 BPM | BODY MASS INDEX: 35.5 KG/M2

## 2025-07-29 DIAGNOSIS — R07.9 CHEST PAIN, UNSPECIFIED TYPE: Primary | ICD-10-CM

## 2025-07-29 DIAGNOSIS — K21.9 GASTROESOPHAGEAL REFLUX DISEASE WITHOUT ESOPHAGITIS: ICD-10-CM

## 2025-07-29 DIAGNOSIS — K44.9 HIATAL HERNIA: ICD-10-CM

## 2025-07-29 LAB
ALBUMIN SERPL-MCNC: 4.4 G/DL (ref 3.5–5.2)
ALBUMIN/GLOB SERPL: 1.6 G/DL
ALP SERPL-CCNC: 91 U/L (ref 39–117)
ALT SERPL W P-5'-P-CCNC: 24 U/L (ref 1–33)
ANION GAP SERPL CALCULATED.3IONS-SCNC: 13.1 MMOL/L (ref 5–15)
AST SERPL-CCNC: 20 U/L (ref 1–32)
B-HCG UR QL: NEGATIVE
BASOPHILS # BLD AUTO: 0.03 10*3/MM3 (ref 0–0.2)
BASOPHILS NFR BLD AUTO: 0.3 % (ref 0–1.5)
BILIRUB SERPL-MCNC: <0.2 MG/DL (ref 0–1.2)
BILIRUB UR QL STRIP: NEGATIVE
BUN SERPL-MCNC: 7.7 MG/DL (ref 6–20)
BUN/CREAT SERPL: 11.5 (ref 7–25)
CALCIUM SPEC-SCNC: 9.4 MG/DL (ref 8.6–10.5)
CHLORIDE SERPL-SCNC: 105 MMOL/L (ref 98–107)
CLARITY UR: CLEAR
CO2 SERPL-SCNC: 20.9 MMOL/L (ref 22–29)
COLOR UR: YELLOW
CREAT SERPL-MCNC: 0.67 MG/DL (ref 0.57–1)
CRP SERPL-MCNC: 1.23 MG/DL (ref 0–0.5)
DEPRECATED RDW RBC AUTO: 42.6 FL (ref 37–54)
EGFRCR SERPLBLD CKD-EPI 2021: 122.3 ML/MIN/1.73
EOSINOPHIL # BLD AUTO: 0.2 10*3/MM3 (ref 0–0.4)
EOSINOPHIL NFR BLD AUTO: 1.8 % (ref 0.3–6.2)
ERYTHROCYTE [DISTWIDTH] IN BLOOD BY AUTOMATED COUNT: 14.6 % (ref 12.3–15.4)
GLOBULIN UR ELPH-MCNC: 2.8 GM/DL
GLUCOSE SERPL-MCNC: 110 MG/DL (ref 65–99)
GLUCOSE UR STRIP-MCNC: NEGATIVE MG/DL
HCT VFR BLD AUTO: 41.5 % (ref 34–46.6)
HGB BLD-MCNC: 12.8 G/DL (ref 12–15.9)
HGB UR QL STRIP.AUTO: NEGATIVE
HOLD SPECIMEN: NORMAL
HOLD SPECIMEN: NORMAL
IMM GRANULOCYTES # BLD AUTO: 0.04 10*3/MM3 (ref 0–0.05)
IMM GRANULOCYTES NFR BLD AUTO: 0.4 % (ref 0–0.5)
KETONES UR QL STRIP: NEGATIVE
LEUKOCYTE ESTERASE UR QL STRIP.AUTO: NEGATIVE
LYMPHOCYTES # BLD AUTO: 1.89 10*3/MM3 (ref 0.7–3.1)
LYMPHOCYTES NFR BLD AUTO: 16.9 % (ref 19.6–45.3)
MCH RBC QN AUTO: 24.8 PG (ref 26.6–33)
MCHC RBC AUTO-ENTMCNC: 30.8 G/DL (ref 31.5–35.7)
MCV RBC AUTO: 80.3 FL (ref 79–97)
MONOCYTES # BLD AUTO: 0.6 10*3/MM3 (ref 0.1–0.9)
MONOCYTES NFR BLD AUTO: 5.4 % (ref 5–12)
NEUTROPHILS NFR BLD AUTO: 75.2 % (ref 42.7–76)
NEUTROPHILS NFR BLD AUTO: 8.41 10*3/MM3 (ref 1.7–7)
NITRITE UR QL STRIP: NEGATIVE
NRBC BLD AUTO-RTO: 0 /100 WBC (ref 0–0.2)
PH UR STRIP.AUTO: 5.5 [PH] (ref 5–8)
PLATELET # BLD AUTO: 375 10*3/MM3 (ref 140–450)
PMV BLD AUTO: 10.4 FL (ref 6–12)
POTASSIUM SERPL-SCNC: 4 MMOL/L (ref 3.5–5.2)
PROT SERPL-MCNC: 7.2 G/DL (ref 6–8.5)
PROT UR QL STRIP: NEGATIVE
RBC # BLD AUTO: 5.17 10*6/MM3 (ref 3.77–5.28)
SODIUM SERPL-SCNC: 139 MMOL/L (ref 136–145)
SP GR UR STRIP: 1.02 (ref 1–1.03)
TROPONIN T SERPL HS-MCNC: <6 NG/L
TSH SERPL DL<=0.05 MIU/L-ACNC: 0.94 UIU/ML (ref 0.27–4.2)
UROBILINOGEN UR QL STRIP: NORMAL
WBC NRBC COR # BLD AUTO: 11.17 10*3/MM3 (ref 3.4–10.8)
WHOLE BLOOD HOLD COAG: NORMAL
WHOLE BLOOD HOLD SPECIMEN: NORMAL

## 2025-07-29 PROCEDURE — 81025 URINE PREGNANCY TEST: CPT | Performed by: PHYSICIAN ASSISTANT

## 2025-07-29 PROCEDURE — 71045 X-RAY EXAM CHEST 1 VIEW: CPT | Performed by: RADIOLOGY

## 2025-07-29 PROCEDURE — 99283 EMERGENCY DEPT VISIT LOW MDM: CPT

## 2025-07-29 PROCEDURE — 80053 COMPREHEN METABOLIC PANEL: CPT | Performed by: PHYSICIAN ASSISTANT

## 2025-07-29 PROCEDURE — 71045 X-RAY EXAM CHEST 1 VIEW: CPT

## 2025-07-29 PROCEDURE — 36415 COLL VENOUS BLD VENIPUNCTURE: CPT

## 2025-07-29 PROCEDURE — 81003 URINALYSIS AUTO W/O SCOPE: CPT | Performed by: PHYSICIAN ASSISTANT

## 2025-07-29 PROCEDURE — 85025 COMPLETE CBC W/AUTO DIFF WBC: CPT | Performed by: PHYSICIAN ASSISTANT

## 2025-07-29 PROCEDURE — 84484 ASSAY OF TROPONIN QUANT: CPT | Performed by: PHYSICIAN ASSISTANT

## 2025-07-29 PROCEDURE — 86140 C-REACTIVE PROTEIN: CPT | Performed by: PHYSICIAN ASSISTANT

## 2025-07-29 PROCEDURE — 93005 ELECTROCARDIOGRAM TRACING: CPT | Performed by: PHYSICIAN ASSISTANT

## 2025-07-29 PROCEDURE — 84443 ASSAY THYROID STIM HORMONE: CPT | Performed by: PHYSICIAN ASSISTANT

## 2025-07-29 RX ORDER — SODIUM CHLORIDE 0.9 % (FLUSH) 0.9 %
10 SYRINGE (ML) INJECTION AS NEEDED
Status: DISCONTINUED | OUTPATIENT
Start: 2025-07-29 | End: 2025-07-29 | Stop reason: HOSPADM

## 2025-07-29 RX ORDER — ALUMINA, MAGNESIA, AND SIMETHICONE 2400; 2400; 240 MG/30ML; MG/30ML; MG/30ML
15 SUSPENSION ORAL ONCE
Status: COMPLETED | OUTPATIENT
Start: 2025-07-29 | End: 2025-07-29

## 2025-07-29 RX ADMIN — ALUMINUM HYDROXIDE, MAGNESIUM HYDROXIDE, AND DIMETHICONE 15 ML: 400; 400; 40 SUSPENSION ORAL at 15:16

## 2025-07-29 NOTE — ED PROVIDER NOTES
Subjective   History of Present Illness  30-year-old female presents to ED with chest pain. Patient states chest pain began this morning. Patient describes it as sharp and pressure like in the midsternal right right side of chest. Patient states it's worse with activity and better with rest. Patient states she has a hiatal hernia she was just diagnosed with a few weeks ago and started on protonix that could possible contribute to chest pain. Patient has EGD scheduled in September. Patient states she used to be on anxiety medication but hasn't taken it in a while because it wasn't helping, admits to some anxiety recently. Patient denies shortness of breath, headache, fever or any other related symptoms. Pertinent medical history includes hypertension, GERD, anxiety and depression.         Review of Systems   Constitutional: Negative.  Negative for fever.   HENT: Negative.     Respiratory: Negative.     Cardiovascular:  Positive for chest pain.   Gastrointestinal: Negative.  Negative for abdominal pain.   Endocrine: Negative.    Genitourinary: Negative.  Negative for dysuria.   Skin: Negative.    Neurological: Negative.    Psychiatric/Behavioral: Negative.     All other systems reviewed and are negative.      Past Medical History:   Diagnosis Date    Anxiety     Depression     GERD (gastroesophageal reflux disease)     Headache     Hypertension     Kidney stone     Obesity        No Known Allergies    Past Surgical History:   Procedure Laterality Date    CYSTECTOMY      chest wall    CYSTOSCOPY, URETEROSCOPY, RETROGRADE PYELOGRAM, STENT INSERTION Right 7/14/2023    Procedure: CYSTOSCOPY URETEROSCOPY RETROGRADE PYELOGRAM HOLMIUM LASER;  Surgeon: Demond Clarke MD;  Location: [facility];  Service: Urology;  Laterality: Right;    DILATATION AND CURETTAGE      EXTRACORPOREAL SHOCK WAVE LITHOTRIPSY (ESWL) Right 6/30/2023    Procedure: EXTRACORPOREAL SHOCKWAVE LITHOTRIPSY;  Surgeon: Demond Clarke MD;   Location: [facility];  Service: Urology;  Laterality: Right;    GALLBLADDER SURGERY  06/02/2020    WISDOM TOOTH EXTRACTION  2014       Family History   Problem Relation Age of Onset    Hypertension Mother     Thyroid disease Mother     Cancer Mother         breast    No Known Problems Father         car wreck     Breast cancer Paternal Grandmother     Hypertension Paternal Grandmother     Arthritis Paternal Grandmother     Cancer Paternal Grandmother         Breast       Social History     Socioeconomic History    Marital status: Single   Tobacco Use    Smoking status: Every Day     Current packs/day: 0.50     Average packs/day: 0.5 packs/day for 6.0 years (3.0 ttl pk-yrs)     Types: Cigarettes    Smokeless tobacco: Never   Vaping Use    Vaping status: Some Days    Substances: Nicotine, Flavoring    Devices: Disposable   Substance and Sexual Activity    Alcohol use: Yes     Comment: pt not had a drink in a month    Drug use: Yes     Types: Marijuana     Comment: 2 weeks since use    Sexual activity: Defer     Partners: Male     Birth control/protection: None           Objective   Physical Exam  Vitals and nursing note reviewed.   Constitutional:       General: She is not in acute distress.     Appearance: She is well-developed. She is not diaphoretic.   HENT:      Head: Normocephalic and atraumatic.      Right Ear: External ear normal.      Left Ear: External ear normal.      Nose: Nose normal.   Eyes:      Conjunctiva/sclera: Conjunctivae normal.      Pupils: Pupils are equal, round, and reactive to light.   Neck:      Vascular: No JVD.      Trachea: No tracheal deviation.   Cardiovascular:      Rate and Rhythm: Normal rate and regular rhythm.      Heart sounds: Normal heart sounds. No murmur heard.  Pulmonary:      Effort: Pulmonary effort is normal. No respiratory distress.      Breath sounds: Normal breath sounds. No wheezing.   Abdominal:      General: Bowel sounds are normal.      Palpations: Abdomen is soft.       Tenderness: There is no abdominal tenderness.   Musculoskeletal:         General: No deformity. Normal range of motion.      Cervical back: Normal range of motion and neck supple.   Skin:     General: Skin is warm and dry.      Coloration: Skin is not pale.      Findings: No erythema or rash.   Neurological:      Mental Status: She is alert and oriented to person, place, and time.      Cranial Nerves: No cranial nerve deficit.   Psychiatric:         Behavior: Behavior normal.         Thought Content: Thought content normal.         Procedures           ED Course  ED Course as of 07/29/25 1612   Tue Jul 29, 2025   1552 ECG 12 Lead Chest Pain  Sinus tachycardia, rate 103, QTc 442, no acute ST or T wave changes [CW]      ED Course User Index  [CW] Juna Guerrero DO                  HEART Score: 1   Shared Decision Making  I discussed the findings with the patient/patient representative who is in agreement with the treatment plan and the final disposition.  Risks and benefits of discharge and/or observation/admission were discussed: Yes       XR Chest AP  Result Date: 7/29/2025  No radiographic evidence of acute cardiac or pulmonary disease.    This report was finalized on 7/29/2025 3:13 PM by Dr. Robert Davis MD.      Results for orders placed or performed during the hospital encounter of 07/29/25   Comprehensive Metabolic Panel    Collection Time: 07/29/25  3:05 PM    Specimen: Blood   Result Value Ref Range    Glucose 110 (H) 65 - 99 mg/dL    BUN 7.7 6.0 - 20.0 mg/dL    Creatinine 0.67 0.57 - 1.00 mg/dL    Sodium 139 136 - 145 mmol/L    Potassium 4.0 3.5 - 5.2 mmol/L    Chloride 105 98 - 107 mmol/L    CO2 20.9 (L) 22.0 - 29.0 mmol/L    Calcium 9.4 8.6 - 10.5 mg/dL    Total Protein 7.2 6.0 - 8.5 g/dL    Albumin 4.4 3.5 - 5.2 g/dL    ALT (SGPT) 24 1 - 33 U/L    AST (SGOT) 20 1 - 32 U/L    Alkaline Phosphatase 91 39 - 117 U/L    Total Bilirubin <0.2 0.0 - 1.2 mg/dL    Globulin 2.8 gm/dL    A/G Ratio 1.6  g/dL    BUN/Creatinine Ratio 11.5 7.0 - 25.0    Anion Gap 13.1 5.0 - 15.0 mmol/L    eGFR 122.3 >60.0 mL/min/1.73   Pregnancy, Urine - Urine, Clean Catch    Collection Time: 07/29/25  3:05 PM    Specimen: Urine, Clean Catch   Result Value Ref Range    HCG, Urine QL Negative Negative   Urinalysis With Microscopic If Indicated (No Culture) - Urine, Clean Catch    Collection Time: 07/29/25  3:05 PM    Specimen: Urine, Clean Catch   Result Value Ref Range    Color, UA Yellow Yellow, Straw    Appearance, UA Clear Clear    pH, UA 5.5 5.0 - 8.0    Specific Gravity, UA 1.020 1.005 - 1.030    Glucose, UA Negative Negative    Ketones, UA Negative Negative    Bilirubin, UA Negative Negative    Blood, UA Negative Negative    Protein, UA Negative Negative    Leuk Esterase, UA Negative Negative    Nitrite, UA Negative Negative    Urobilinogen, UA 0.2 E.U./dL 0.2 - 1.0 E.U./dL   High Sensitivity Troponin T    Collection Time: 07/29/25  3:05 PM    Specimen: Blood   Result Value Ref Range    HS Troponin T <6 <14 ng/L   C-reactive Protein    Collection Time: 07/29/25  3:05 PM    Specimen: Blood   Result Value Ref Range    C-Reactive Protein 1.23 (H) 0.00 - 0.50 mg/dL   CBC Auto Differential    Collection Time: 07/29/25  3:05 PM    Specimen: Blood   Result Value Ref Range    WBC 11.17 (H) 3.40 - 10.80 10*3/mm3    RBC 5.17 3.77 - 5.28 10*6/mm3    Hemoglobin 12.8 12.0 - 15.9 g/dL    Hematocrit 41.5 34.0 - 46.6 %    MCV 80.3 79.0 - 97.0 fL    MCH 24.8 (L) 26.6 - 33.0 pg    MCHC 30.8 (L) 31.5 - 35.7 g/dL    RDW 14.6 12.3 - 15.4 %    RDW-SD 42.6 37.0 - 54.0 fl    MPV 10.4 6.0 - 12.0 fL    Platelets 375 140 - 450 10*3/mm3    Neutrophil % 75.2 42.7 - 76.0 %    Lymphocyte % 16.9 (L) 19.6 - 45.3 %    Monocyte % 5.4 5.0 - 12.0 %    Eosinophil % 1.8 0.3 - 6.2 %    Basophil % 0.3 0.0 - 1.5 %    Immature Grans % 0.4 0.0 - 0.5 %    Neutrophils, Absolute 8.41 (H) 1.70 - 7.00 10*3/mm3    Lymphocytes, Absolute 1.89 0.70 - 3.10 10*3/mm3    Monocytes,  Absolute 0.60 0.10 - 0.90 10*3/mm3    Eosinophils, Absolute 0.20 0.00 - 0.40 10*3/mm3    Basophils, Absolute 0.03 0.00 - 0.20 10*3/mm3    Immature Grans, Absolute 0.04 0.00 - 0.05 10*3/mm3    nRBC 0.0 0.0 - 0.2 /100 WBC   TSH Rfx On Abnormal To Free T4    Collection Time: 07/29/25  3:05 PM    Specimen: Blood   Result Value Ref Range    TSH 0.942 0.270 - 4.200 uIU/mL   Green Top (Gel)    Collection Time: 07/29/25  3:05 PM   Result Value Ref Range    Extra Tube Hold for add-ons.    Lavender Top    Collection Time: 07/29/25  3:05 PM   Result Value Ref Range    Extra Tube hold for add-on    Gold Top - SST    Collection Time: 07/29/25  3:05 PM   Result Value Ref Range    Extra Tube Hold for add-ons.    Light Blue Top    Collection Time: 07/29/25  3:05 PM   Result Value Ref Range    Extra Tube Hold for add-ons.                                     Medical Decision Making  30-year-old female presents to ED with chest pain. Patient states chest pain began this morning. Patient describes it as sharp and pressure like in the midsternal right right side of chest. Patient states it's worse with activity and better with rest. Patient states she has a hiatal hernia she was just diagnosed with a few weeks ago and started on protonix that could possible contribute to chest pain. Patient has EGD scheduled in September. Patient states she used to be on anxiety medication but hasn't taken it in a while because it wasn't helping, admits to some anxiety recently. Patient denies shortness of breath, headache, fever or any other related symptoms. Pertinent medical history includes hypertension, GERD, anxiety and depression.         Problems Addressed:  Chest pain, unspecified type: complicated acute illness or injury  Gastroesophageal reflux disease without esophagitis: complicated acute illness or injury  Hiatal hernia: complicated acute illness or injury    Amount and/or Complexity of Data Reviewed  Labs: ordered.  Radiology:  ordered.  ECG/medicine tests:  Decision-making details documented in ED Course.    Risk  OTC drugs.  Prescription drug management.        Final diagnoses:   Chest pain, unspecified type   Gastroesophageal reflux disease without esophagitis   Hiatal hernia       ED Disposition  ED Disposition       ED Disposition   Discharge    Condition   Stable    Comment   --               Angi Molina, APRN  [address] [facility] [address]  [phone]    In 3 days           Medication List      No changes were made to your prescriptions during this visit.            Josephine Morales, PA-C  07/29/25 [id number]

## 2025-07-30 LAB
QT INTERVAL: 342 MS
QTC INTERVAL: 448 MS

## (undated) DEVICE — COR CYSTO: Brand: MEDLINE INDUSTRIES, INC.

## (undated) DEVICE — NITINOL STONE RETRIEVAL BASKET: Brand: ZERO TIP

## (undated) DEVICE — FIBR LASR FLEXIVAPULSE365 HOLMIUM FLT/TP 1P/U